# Patient Record
Sex: MALE | Race: WHITE | NOT HISPANIC OR LATINO | Employment: FULL TIME | URBAN - METROPOLITAN AREA
[De-identification: names, ages, dates, MRNs, and addresses within clinical notes are randomized per-mention and may not be internally consistent; named-entity substitution may affect disease eponyms.]

---

## 2018-07-16 ENCOUNTER — OFFICE VISIT (OUTPATIENT)
Dept: MEDICAL GROUP | Facility: PHYSICIAN GROUP | Age: 58
End: 2018-07-16
Payer: COMMERCIAL

## 2018-07-16 VITALS
RESPIRATION RATE: 12 BRPM | DIASTOLIC BLOOD PRESSURE: 70 MMHG | WEIGHT: 223 LBS | BODY MASS INDEX: 30.2 KG/M2 | HEIGHT: 72 IN | SYSTOLIC BLOOD PRESSURE: 138 MMHG | HEART RATE: 91 BPM | TEMPERATURE: 99.1 F | OXYGEN SATURATION: 95 %

## 2018-07-16 DIAGNOSIS — Z00.00 WELL ADULT EXAM: ICD-10-CM

## 2018-07-16 DIAGNOSIS — E66.9 OBESITY (BMI 30-39.9): ICD-10-CM

## 2018-07-16 PROCEDURE — 99386 PREV VISIT NEW AGE 40-64: CPT | Performed by: FAMILY MEDICINE

## 2018-07-16 ASSESSMENT — ENCOUNTER SYMPTOMS
BLURRED VISION: 0
DIZZINESS: 0
CHILLS: 0
HEARTBURN: 0
BRUISES/BLEEDS EASILY: 0
COUGH: 0
HEADACHES: 0
GASTROINTESTINAL NEGATIVE: 1
DOUBLE VISION: 0
BACK PAIN: 1
RESPIRATORY NEGATIVE: 1
DEPRESSION: 0
MYALGIAS: 0
HEMOPTYSIS: 0
EYES NEGATIVE: 1
PALPITATIONS: 0
FEVER: 0
TINGLING: 0
NAUSEA: 0
NEUROLOGICAL NEGATIVE: 1
CONSTITUTIONAL NEGATIVE: 1
CARDIOVASCULAR NEGATIVE: 1
PSYCHIATRIC NEGATIVE: 1

## 2018-07-16 ASSESSMENT — PATIENT HEALTH QUESTIONNAIRE - PHQ9: CLINICAL INTERPRETATION OF PHQ2 SCORE: 0

## 2018-07-16 NOTE — PROGRESS NOTES
Subjective:      Kris Lewis is a 57 y.o. male who presents with Back Pain (lower back )            New patient visit works as owner of [employer]  History of kirti en y in the past and colonic polyps with q 5 year colonoscopies    1. Well adult exam    - REFERRAL TO GI FOR COLONOSCOPY  - Patient identified as having weight management issue.  Appropriate orders and counseling given.  - COMP METABOLIC PANEL; Future  - FREE THYROXINE; Future  - LIPID PROFILE; Future  - TRIIDOTHYRONINE; Future  - TSH; Future  - VITAMIN D,25 HYDROXY; Future  - CBC WITHOUT DIFFERENTIAL; Future  - PROSTATE SPECIFIC AG SCREENING; Future  - VITAMIN B12; Future  - FOLATE; Future    2. Obesity (BMI 30-39.9)    - REFERRAL TO GI FOR COLONOSCOPY  - Patient identified as having weight management issue.  Appropriate orders and counseling given.  - COMP METABOLIC PANEL; Future  - FREE THYROXINE; Future  - LIPID PROFILE; Future  - TRIIDOTHYRONINE; Future  - TSH; Future  - VITAMIN D,25 HYDROXY; Future  - CBC WITHOUT DIFFERENTIAL; Future  - PROSTATE SPECIFIC AG SCREENING; Future  - VITAMIN B12; Future  - FOLATE; Future    History reviewed. No pertinent past medical history.  Past Surgical History:  No date: GASTRIC BYPASS LAPAROSCOPIC  Smoking status: Former Smoker                                                              Packs/day: 0.00      Years: 10.00        Quit date: 7/16/2000  Smokeless tobacco: Never Used                      Alcohol use: No              Review of patient's family history indicates:    Cancer                         Father                      No current outpatient prescriptions on file.    Patient was instructed on the use of medications, either prescriptions or OTC and informed on when the appropriate follow up time period should be. In addition, patient was also instructed that should any acute worsening occur that they should notify this clinic asap or call 911.            Review of Systems   Constitutional:  "Negative.  Negative for chills and fever.   HENT: Negative.  Negative for hearing loss.    Eyes: Negative.  Negative for blurred vision and double vision.   Respiratory: Negative.  Negative for cough and hemoptysis.    Cardiovascular: Negative.  Negative for chest pain and palpitations.   Gastrointestinal: Negative.  Negative for heartburn and nausea.   Genitourinary: Negative.  Negative for dysuria.   Musculoskeletal: Positive for back pain. Negative for myalgias.   Skin: Negative.  Negative for rash.   Neurological: Negative.  Negative for dizziness, tingling and headaches.   Endo/Heme/Allergies: Negative.  Does not bruise/bleed easily.   Psychiatric/Behavioral: Negative.  Negative for depression and suicidal ideas.   All other systems reviewed and are negative.         Objective:     /70   Pulse 91   Temp 37.3 °C (99.1 °F)   Resp 12   Ht 1.82 m (5' 11.65\")   Wt 101.2 kg (223 lb)   SpO2 95%   BMI 30.54 kg/m²      Physical Exam   Constitutional: He is oriented to person, place, and time. He appears well-developed and well-nourished. No distress.   HENT:   Head: Normocephalic and atraumatic.   Right Ear: External ear normal.   Left Ear: External ear normal.   Nose: Nose normal.   Mouth/Throat: Oropharynx is clear and moist. No oropharyngeal exudate.   Eyes: Pupils are equal, round, and reactive to light. Right eye exhibits no discharge. Left eye exhibits no discharge. No scleral icterus.   Neck: Normal range of motion. Neck supple. No JVD present. No tracheal deviation present. No thyromegaly present.   Cardiovascular: Normal rate, regular rhythm, normal heart sounds and intact distal pulses.  Exam reveals no gallop and no friction rub.    No murmur heard.  Pulmonary/Chest: Effort normal and breath sounds normal. No stridor. No respiratory distress. He has no wheezes. He has no rales. He exhibits no tenderness.   Abdominal: Soft. He exhibits no distension. There is no tenderness.   Lymphadenopathy:     " He has no cervical adenopathy.   Neurological: He is alert and oriented to person, place, and time. No cranial nerve deficit.   Skin: He is not diaphoretic.   Psychiatric: He has a normal mood and affect. His behavior is normal. Judgment and thought content normal.   Nursing note and vitals reviewed.              Assessment/Plan:     1. Well adult exam    - REFERRAL TO GI FOR COLONOSCOPY  - Patient identified as having weight management issue.  Appropriate orders and counseling given.  - COMP METABOLIC PANEL; Future  - FREE THYROXINE; Future  - LIPID PROFILE; Future  - TRIIDOTHYRONINE; Future  - TSH; Future  - VITAMIN D,25 HYDROXY; Future  - CBC WITHOUT DIFFERENTIAL; Future  - PROSTATE SPECIFIC AG SCREENING; Future  - VITAMIN B12; Future  - FOLATE; Future    2. Obesity (BMI 30-39.9)  - REFERRAL TO GI FOR COLONOSCOPY  - Patient identified as having weight management issue.  Appropriate orders and counseling given.  - COMP METABOLIC PANEL; Future  - FREE THYROXINE; Future  - LIPID PROFILE; Future  - TRIIDOTHYRONINE; Future  - TSH; Future  - VITAMIN D,25 HYDROXY; Future  - CBC WITHOUT DIFFERENTIAL; Future  - PROSTATE SPECIFIC AG SCREENING; Future  - VITAMIN B12; Future  - FOLATE; Future

## 2018-07-18 ENCOUNTER — HOSPITAL ENCOUNTER (OUTPATIENT)
Dept: LAB | Facility: MEDICAL CENTER | Age: 58
End: 2018-07-18
Attending: FAMILY MEDICINE
Payer: COMMERCIAL

## 2018-07-18 DIAGNOSIS — Z00.00 WELL ADULT EXAM: ICD-10-CM

## 2018-07-18 DIAGNOSIS — E66.9 OBESITY (BMI 30-39.9): ICD-10-CM

## 2018-07-18 LAB
25(OH)D3 SERPL-MCNC: 25 NG/ML (ref 30–100)
ALBUMIN SERPL BCP-MCNC: 3.7 G/DL (ref 3.2–4.9)
ALBUMIN/GLOB SERPL: 1.1 G/DL
ALP SERPL-CCNC: 97 U/L (ref 30–99)
ALT SERPL-CCNC: 13 U/L (ref 2–50)
ANION GAP SERPL CALC-SCNC: 7 MMOL/L (ref 0–11.9)
AST SERPL-CCNC: 16 U/L (ref 12–45)
BILIRUB SERPL-MCNC: 0.7 MG/DL (ref 0.1–1.5)
BUN SERPL-MCNC: 15 MG/DL (ref 8–22)
CALCIUM SERPL-MCNC: 8.8 MG/DL (ref 8.5–10.5)
CHLORIDE SERPL-SCNC: 105 MMOL/L (ref 96–112)
CHOLEST SERPL-MCNC: 163 MG/DL (ref 100–199)
CO2 SERPL-SCNC: 28 MMOL/L (ref 20–33)
CREAT SERPL-MCNC: 0.82 MG/DL (ref 0.5–1.4)
ERYTHROCYTE [DISTWIDTH] IN BLOOD BY AUTOMATED COUNT: 48.5 FL (ref 35.9–50)
FOLATE SERPL-MCNC: 8.7 NG/ML
GLOBULIN SER CALC-MCNC: 3.4 G/DL (ref 1.9–3.5)
GLUCOSE SERPL-MCNC: 92 MG/DL (ref 65–99)
HCT VFR BLD AUTO: 46.7 % (ref 42–52)
HDLC SERPL-MCNC: 65 MG/DL
HGB BLD-MCNC: 15.5 G/DL (ref 14–18)
LDLC SERPL CALC-MCNC: 78 MG/DL
MCH RBC QN AUTO: 31.3 PG (ref 27–33)
MCHC RBC AUTO-ENTMCNC: 33.2 G/DL (ref 33.7–35.3)
MCV RBC AUTO: 94.3 FL (ref 81.4–97.8)
PLATELET # BLD AUTO: 214 K/UL (ref 164–446)
PMV BLD AUTO: 11 FL (ref 9–12.9)
POTASSIUM SERPL-SCNC: 4.1 MMOL/L (ref 3.6–5.5)
PROT SERPL-MCNC: 7.1 G/DL (ref 6–8.2)
PSA SERPL-MCNC: 1.7 NG/ML (ref 0–4)
RBC # BLD AUTO: 4.95 M/UL (ref 4.7–6.1)
SODIUM SERPL-SCNC: 140 MMOL/L (ref 135–145)
T3 SERPL-MCNC: 65.2 NG/DL (ref 60–181)
T4 FREE SERPL-MCNC: 0.79 NG/DL (ref 0.53–1.43)
TRIGL SERPL-MCNC: 101 MG/DL (ref 0–149)
TSH SERPL DL<=0.005 MIU/L-ACNC: 2.66 UIU/ML (ref 0.38–5.33)
VIT B12 SERPL-MCNC: 525 PG/ML (ref 211–911)
WBC # BLD AUTO: 8.4 K/UL (ref 4.8–10.8)

## 2018-07-18 PROCEDURE — 85027 COMPLETE CBC AUTOMATED: CPT

## 2018-07-18 PROCEDURE — 82746 ASSAY OF FOLIC ACID SERUM: CPT

## 2018-07-18 PROCEDURE — 82306 VITAMIN D 25 HYDROXY: CPT

## 2018-07-18 PROCEDURE — 80061 LIPID PANEL: CPT

## 2018-07-18 PROCEDURE — 84480 ASSAY TRIIODOTHYRONINE (T3): CPT

## 2018-07-18 PROCEDURE — 36415 COLL VENOUS BLD VENIPUNCTURE: CPT

## 2018-07-18 PROCEDURE — 84443 ASSAY THYROID STIM HORMONE: CPT

## 2018-07-18 PROCEDURE — 84153 ASSAY OF PSA TOTAL: CPT

## 2018-07-18 PROCEDURE — 84439 ASSAY OF FREE THYROXINE: CPT

## 2018-07-18 PROCEDURE — 80053 COMPREHEN METABOLIC PANEL: CPT

## 2018-07-18 PROCEDURE — 82607 VITAMIN B-12: CPT

## 2018-07-19 ENCOUNTER — TELEPHONE (OUTPATIENT)
Dept: MEDICAL GROUP | Facility: PHYSICIAN GROUP | Age: 58
End: 2018-07-19

## 2018-07-19 NOTE — TELEPHONE ENCOUNTER
----- Message from Dima Stevens M.D. sent at 7/19/2018  8:34 AM PDT -----  Labs show patient is low on vitamin d, they needs to replace this with 2000 units per day otc

## 2018-08-25 ENCOUNTER — OFFICE VISIT (OUTPATIENT)
Dept: URGENT CARE | Facility: CLINIC | Age: 58
End: 2018-08-25
Payer: COMMERCIAL

## 2018-08-25 VITALS
BODY MASS INDEX: 31.36 KG/M2 | RESPIRATION RATE: 16 BRPM | OXYGEN SATURATION: 97 % | DIASTOLIC BLOOD PRESSURE: 86 MMHG | HEIGHT: 71 IN | SYSTOLIC BLOOD PRESSURE: 146 MMHG | HEART RATE: 76 BPM | WEIGHT: 224 LBS | TEMPERATURE: 98.2 F

## 2018-08-25 DIAGNOSIS — B02.9 HERPES ZOSTER WITHOUT COMPLICATION: ICD-10-CM

## 2018-08-25 PROCEDURE — 99214 OFFICE O/P EST MOD 30 MIN: CPT | Performed by: NURSE PRACTITIONER

## 2018-08-25 RX ORDER — HYDROCODONE BITARTRATE AND ACETAMINOPHEN 5; 325 MG/1; MG/1
1-2 TABLET ORAL EVERY 4 HOURS PRN
Qty: 15 TAB | Refills: 0 | Status: SHIPPED | OUTPATIENT
Start: 2018-08-25 | End: 2018-08-29

## 2018-08-25 RX ORDER — VALACYCLOVIR HYDROCHLORIDE 1 G/1
1000 TABLET, FILM COATED ORAL 2 TIMES DAILY
Qty: 14 TAB | Refills: 0 | Status: SHIPPED | OUTPATIENT
Start: 2018-08-25 | End: 2018-09-01

## 2018-08-25 ASSESSMENT — ENCOUNTER SYMPTOMS
FEVER: 0
BACK PAIN: 1

## 2018-08-25 NOTE — PROGRESS NOTES
"Subjective:      Kris Lewis is a 57 y.o. male who presents with Low Back Pain (Started Wednesday low back pain on right side that goes down leg, pt states he can feel the pain in his bone) and Rash (Left leg, noticed friday, no itch)            This is a new problem. Pt reports he developed lower right back and leg pain about 3 days ago. He admits he went to the chiropractor 2 days ago for an adjustment and states the back pain improved only slightly. He then reports he developed a rash on his right buttock and down onto his right thigh. He states the pain he is experiencing in his leg is very deep and it aches almost constantly. He has been taking Advil every 4 hours at night to help him sleep but it does not seem to be working. He denies any recent fever. He has not taken any medication for the rash.        Review of Systems   Constitutional: Negative for fever.   Musculoskeletal: Positive for back pain.   Skin: Positive for rash.   All other systems reviewed and are negative.    History reviewed. No pertinent past medical history.   Past Surgical History:   Procedure Laterality Date   • GASTRIC BYPASS LAPAROSCOPIC        Social History     Social History   • Marital status:      Spouse name: N/A   • Number of children: N/A   • Years of education: N/A     Occupational History   • Not on file.     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 " Vital signs are normal. He appears well-developed and well-nourished.   HENT:   Head: Normocephalic and atraumatic.   Eyes: Pupils are equal, round, and reactive to light. EOM are normal.   Neck: Normal range of motion.   Cardiovascular: Normal rate and regular rhythm.    Pulmonary/Chest: Effort normal.   Musculoskeletal: Normal range of motion.        Lumbar back: He exhibits pain.        Back:         Legs:  Neurological: He is alert and oriented to person, place, and time.   Skin: Skin is warm and dry. Capillary refill takes less than 2 seconds.   Psychiatric: He has a normal mood and affect. His speech is normal and behavior is normal. Thought content normal.   Vitals reviewed.              Assessment/Plan:     1. Herpes zoster without complication  - valacyclovir (VALTREX) 1 GM Tab; Take 1 Tab by mouth 2 times a day for 7 days.  Dispense: 14 Tab; Refill: 0  - HYDROcodone-acetaminophen (NORCO) 5-325 MG Tab per tablet; Take 1-2 Tabs by mouth every four hours as needed for up to 4 days.  Dispense: 15 Tab; Refill: 0  - CONSENT FOR OPIATE PRESCRIPTION    Advised pt to avoid small children and the elderly for another 7 days as this is the time when he is the most contagious. Wear long pants and avoid direct skin to skin contact with any individual  In 10 days I would like for him to follow up with PCP to have a follow up check and get the Zostavax vaccine. He understands and agrees  Can alternate tylenol and ibuprofen as needed for pain  Sedating effects of pain medication discussed. Consent signed. Checked patient's  and find no evidence of narcotic misuse.  Supportive care, differential diagnoses, and indications for immediate follow-up discussed with patient.    Pathogenesis of diagnosis discussed including typical length and natural progression.      Instructed to return to  or nearest emergency department if symptoms fail to improve, for any change in condition, further concerns, or new concerning  symptoms.  Patient states understanding of the plan of care and discharge instructions.

## 2018-09-04 ENCOUNTER — OFFICE VISIT (OUTPATIENT)
Dept: MEDICAL GROUP | Facility: PHYSICIAN GROUP | Age: 58
End: 2018-09-04
Payer: COMMERCIAL

## 2018-09-04 VITALS
SYSTOLIC BLOOD PRESSURE: 160 MMHG | BODY MASS INDEX: 30.94 KG/M2 | TEMPERATURE: 98.4 F | DIASTOLIC BLOOD PRESSURE: 80 MMHG | WEIGHT: 221 LBS | RESPIRATION RATE: 16 BRPM | HEART RATE: 80 BPM | HEIGHT: 71 IN | OXYGEN SATURATION: 95 %

## 2018-09-04 DIAGNOSIS — B02.9 HERPES ZOSTER WITHOUT COMPLICATION: ICD-10-CM

## 2018-09-04 PROCEDURE — 99214 OFFICE O/P EST MOD 30 MIN: CPT | Performed by: FAMILY MEDICINE

## 2018-09-04 RX ORDER — VALACYCLOVIR HYDROCHLORIDE 1 G/1
1000 TABLET, FILM COATED ORAL 2 TIMES DAILY
COMMUNITY
End: 2021-03-08

## 2018-09-04 RX ORDER — OXYCODONE HYDROCHLORIDE AND ACETAMINOPHEN 5; 325 MG/1; MG/1
1 TABLET ORAL 2 TIMES DAILY PRN
Qty: 20 TAB | Refills: 0 | Status: SHIPPED | OUTPATIENT
Start: 2018-09-04 | End: 2018-09-14

## 2018-09-04 RX ORDER — HYDROCODONE BITARTRATE AND ACETAMINOPHEN 5; 325 MG/1; MG/1
1-2 TABLET ORAL EVERY 4 HOURS PRN
COMMUNITY
End: 2021-03-08

## 2018-09-04 ASSESSMENT — ENCOUNTER SYMPTOMS
BLURRED VISION: 0
RESPIRATORY NEGATIVE: 1
EYES NEGATIVE: 1
NAUSEA: 0
CHILLS: 0
PALPITATIONS: 0
TINGLING: 0
HEARTBURN: 0
DOUBLE VISION: 0
COUGH: 0
DEPRESSION: 0
MUSCULOSKELETAL NEGATIVE: 1
PSYCHIATRIC NEGATIVE: 1
CARDIOVASCULAR NEGATIVE: 1
HEADACHES: 0
NEUROLOGICAL NEGATIVE: 1
MYALGIAS: 0
DIZZINESS: 0
FEVER: 0
HEMOPTYSIS: 0
CONSTITUTIONAL NEGATIVE: 1
GASTROINTESTINAL NEGATIVE: 1
BRUISES/BLEEDS EASILY: 0

## 2018-09-04 NOTE — PROGRESS NOTES
Subjective:      Kris Lewis is a 57 y.o. male who presents with Immunizations            1. Herpes zoster without complication  [facility]les [facility]  Rash and pain in the right leg and buttocks  Improved with percocet for pain but not with norco  Will give short rx for percocet and will sign consent form   This patient is continuing to use a controlled substance (CS) on a long term basis.  The patient is thoroughly aware of the goals of treatment with the CS  The patient is aware that yearly and random urine drug screens are required.  The patient has been instructed to take the CS only as prescribed.  The patient is prohibited from sharing the CS with any other person.  The patient is instructed to inform the provider if any other CS is taken, of any alcohol or cannabis or other recreational drug use, any treatment for side effects of the CS or complications, if they have CS active rx in other states  The patient has evidence for a reason for the CS  The treatment plan has been discussed with the patient  The  report has been reviewed  Will need to wait until rash is completely resolved before he can get the shingles vaccine  - HYDROcodone-acetaminophen (NORCO) 5-325 MG Tab per tablet; Take 1-2 Tabs by mouth every four hours as needed.  - valacyclovir (VALTREX) 1 GM Tab; Take 1,000 mg by mouth 2 times a day.  - Zoster Vac Recomb Adjuvanted (SHINGRIX) 50 MCG Recon Susp; 0.5 mL by Intramuscular route Once for 1 dose.  Dispense: 0.5 mL; Refill: 0  - Lidocaine 2 % Gel; 1 g by Apply externally route 2 times a day as needed.  Dispense: 60 g; Refill: 3  - oxyCODONE-acetaminophen (PERCOCET) 5-325 MG Tab; Take 1 Tab by mouth 2 times a day as needed for up to 10 days.  Dispense: 20 Tab; Refill: 0  - Controlled Substance Treatment Agreement    History reviewed. No pertinent past medical history.  Past Surgical History:  No date: GASTRIC BYPASS LAPAROSCOPIC  Smoking status: Former Smoker                                                               Packs/day: 0.00      Years: 10.00        Quit date: 7/16/2000  Smokeless tobacco: Never Used                      Alcohol use: No              Review of patient's family history indicates:  Problem: Cancer      Relation: Father       Age of Onset: (Not Specified)       Current Outpatient Prescriptions: •  HYDROcodone-acetaminophen (NORCO) 5-325 MG Tab per tablet, Take 1-2 Tabs by mouth every four hours as needed., Disp: , Rfl: •  valacyclovir (VALTREX) 1 GM Tab, Take 1,000 mg by mouth 2 times a day., Disp: , Rfl: •  Zoster Vac Recomb Adjuvanted (SHINGRIX) 50 MCG Recon Susp, 0.5 mL by Intramuscular route Once for 1 dose., Disp: 0.5 mL, Rfl: 0•  Lidocaine 2 % Gel, 1 g by Apply externally route 2 times a day as needed., Disp: 60 g, Rfl: 3•  oxyCODONE-acetaminophen (PERCOCET) 5-325 MG Tab, Take 1 Tab by mouth 2 times a day as needed for up to 10 days., Disp: 20 Tab, Rfl: 0    Patient was instructed on the use of medications, either prescriptions or OTC and informed on when the appropriate follow up time period should be. In addition, patient was also instructed that should any acute worsening occur that they should notify this clinic asap or call 911.            Review of Systems   Constitutional: Negative.  Negative for chills and fever.   HENT: Negative.  Negative for hearing loss.    Eyes: Negative.  Negative for blurred vision and double vision.   Respiratory: Negative.  Negative for cough and hemoptysis.    Cardiovascular: Negative.  Negative for chest pain and palpitations.   Gastrointestinal: Negative.  Negative for heartburn and nausea.   Genitourinary: Negative.  Negative for dysuria.   Musculoskeletal: Negative.  Negative for myalgias.   Skin: Positive for itching and rash.   Neurological: Negative.  Negative for dizziness, tingling and headaches.   Endo/Heme/Allergies: Negative.  Does not bruise/bleed easily.   Psychiatric/Behavioral: Negative.  Negative for depression and suicidal  "ideas.   All other systems reviewed and are negative.         Objective:     /80   Pulse 80   Temp 36.9 °C (98.4 °F)   Resp 16   Ht 1.803 m (5' 11\")   Wt 100.2 kg (221 lb)   SpO2 95%   BMI 30.82 kg/m²      Physical Exam   Constitutional: He is oriented to person, place, and time. He appears well-developed and well-nourished. No distress.   HENT:   Head: Normocephalic and atraumatic.   Mouth/Throat: Oropharynx is clear and moist. No oropharyngeal exudate.   Eyes: Pupils are equal, round, and reactive to light.   Cardiovascular: Normal rate, regular rhythm, normal heart sounds and intact distal pulses.  Exam reveals no gallop and no friction rub.    No murmur heard.  Pulmonary/Chest: Effort normal and breath sounds normal. No respiratory distress. He has no wheezes. He has no rales. He exhibits no tenderness.   Neurological: He is alert and oriented to person, place, and time.   Skin: He is not diaphoretic.        Healing vesicles in areas noted   Psychiatric: He has a normal mood and affect. His behavior is normal. Judgment and thought content normal.   Nursing note and vitals reviewed.              Assessment/Plan:     1. Herpes zoster without complication    - HYDROcodone-acetaminophen (NORCO) 5-325 MG Tab per tablet; Take 1-2 Tabs by mouth every four hours as needed.  - valacyclovir (VALTREX) 1 GM Tab; Take 1,000 mg by mouth 2 times a day.  - Zoster Vac Recomb Adjuvanted (SHINGRIX) 50 MCG Recon Susp; 0.5 mL by Intramuscular route Once for 1 dose.  Dispense: 0.5 mL; Refill: 0  - Lidocaine 2 % Gel; 1 g by Apply externally route 2 times a day as needed.  Dispense: 60 g; Refill: 3  - oxyCODONE-acetaminophen (PERCOCET) 5-325 MG Tab; Take 1 Tab by mouth 2 times a day as needed for up to 10 days.  Dispense: 20 Tab; Refill: 0  - Controlled Substance Treatment Agreement      "

## 2020-03-03 ENCOUNTER — OFFICE VISIT (OUTPATIENT)
Dept: MEDICAL GROUP | Facility: PHYSICIAN GROUP | Age: 60
End: 2020-03-03
Payer: COMMERCIAL

## 2020-03-03 VITALS
BODY MASS INDEX: 28.79 KG/M2 | WEIGHT: 212.6 LBS | TEMPERATURE: 98.6 F | OXYGEN SATURATION: 96 % | RESPIRATION RATE: 16 BRPM | HEIGHT: 72 IN | HEART RATE: 111 BPM | SYSTOLIC BLOOD PRESSURE: 130 MMHG | DIASTOLIC BLOOD PRESSURE: 70 MMHG

## 2020-03-03 DIAGNOSIS — R31.9 URINARY TRACT INFECTION WITH HEMATURIA, SITE UNSPECIFIED: ICD-10-CM

## 2020-03-03 DIAGNOSIS — R30.0 DYSURIA: ICD-10-CM

## 2020-03-03 DIAGNOSIS — N39.0 URINARY TRACT INFECTION WITH HEMATURIA, SITE UNSPECIFIED: ICD-10-CM

## 2020-03-03 LAB
APPEARANCE UR: CLEAR
BILIRUB UR STRIP-MCNC: NEGATIVE MG/DL
COLOR UR AUTO: NORMAL
GLUCOSE UR STRIP.AUTO-MCNC: NEGATIVE MG/DL
KETONES UR STRIP.AUTO-MCNC: NEGATIVE MG/DL
LEUKOCYTE ESTERASE UR QL STRIP.AUTO: NORMAL
NITRITE UR QL STRIP.AUTO: NEGATIVE
PH UR STRIP.AUTO: 5 [PH] (ref 5–8)
PROT UR QL STRIP: NORMAL MG/DL
RBC UR QL AUTO: NORMAL
SP GR UR STRIP.AUTO: 1.03
UROBILINOGEN UR STRIP-MCNC: 0.2 MG/DL

## 2020-03-03 PROCEDURE — 81002 URINALYSIS NONAUTO W/O SCOPE: CPT | Performed by: FAMILY MEDICINE

## 2020-03-03 PROCEDURE — 99214 OFFICE O/P EST MOD 30 MIN: CPT | Performed by: FAMILY MEDICINE

## 2020-03-03 RX ORDER — SULFAMETHOXAZOLE AND TRIMETHOPRIM 800; 160 MG/1; MG/1
1 TABLET ORAL EVERY 12 HOURS
Qty: 28 TAB | Refills: 0 | Status: SHIPPED | OUTPATIENT
Start: 2020-03-03 | End: 2020-03-17

## 2020-03-03 ASSESSMENT — PATIENT HEALTH QUESTIONNAIRE - PHQ9: CLINICAL INTERPRETATION OF PHQ2 SCORE: 0

## 2020-03-03 ASSESSMENT — FIBROSIS 4 INDEX: FIB4 SCORE: 1.2

## 2020-03-03 NOTE — PROGRESS NOTES
Chief Complaint   Patient presents with   • Dysuria     x4 days progresively getting worse, bloody urine, testicular pain x1day       HPI:  Symptom onset: 5 days ago   Current symptoms: Painful, urgent, frequent voids. No blood noted in urine.  Since onset symptoms are: Unchanged  Treatments tried: OTC antispasm med.  Associated symptoms: Negative for fever, flank pain, nausea and vomiting, vaginal discharge, pelvic pain.  History is positive for frequent UTI.     ROS:  Denies fever, chills, vomiting or abdominal pain.     OBJECTIVE:  /70 (BP Location: Left arm, Patient Position: Sitting)   Pulse (!) 111   Temp 37 °C (98.6 °F) (Tympanic)   Resp 16   Ht 1.829 m (6')   Wt 96.4 kg (212 lb 9.6 oz)   SpO2 96%   Gen: Alert, NAD.  Chest: Lungs clear to auscultation, CV RRR.  Abdomen: Soft, tender in suprapubic region. No CVAT. Normal bowel sounds.     Lab Results   Component Value Date    POCCOLOR dark yellow 03/03/2020    POCAPPEAR clear 03/03/2020    POCLEUKEST Small 03/03/2020    POCNITRITE Negative 03/03/2020    POCUROBILIGE 0.2 03/03/2020    POCPROTEIN trace 03/03/2020    POCURPH 5.0 03/03/2020    POCBLOOD trace 03/03/2020    POCSPGRV 1.030 03/03/2020    POCKETONES negative 03/03/2020    POCBILIRUBIN Negative 03/03/2020    POCGLUCUA Negative 03/03/2020          ASSESSMENT/PLAN:     1. Dysuria    2. Urinary tract infection with hematuria, site unspecified     f/u one month for another dip to make sure blood gone     1. Abnormal urine dipstick in office. Urine sent for culture. Start antibiotics.  2. Provided education to drink plenty of fluids, wipe front to back every void and bowel movement.   3. Return to clinic if symptoms not improving within 3-4 days or in case of vomiting, fever, increasing pain.

## 2020-04-06 ENCOUNTER — OFFICE VISIT (OUTPATIENT)
Dept: MEDICAL GROUP | Facility: PHYSICIAN GROUP | Age: 60
End: 2020-04-06
Payer: COMMERCIAL

## 2020-04-06 VITALS
HEART RATE: 89 BPM | HEIGHT: 73 IN | OXYGEN SATURATION: 96 % | SYSTOLIC BLOOD PRESSURE: 142 MMHG | TEMPERATURE: 98.2 F | RESPIRATION RATE: 16 BRPM | WEIGHT: 218 LBS | BODY MASS INDEX: 28.89 KG/M2 | DIASTOLIC BLOOD PRESSURE: 74 MMHG

## 2020-04-06 DIAGNOSIS — R30.0 DYSURIA: ICD-10-CM

## 2020-04-06 LAB
APPEARANCE UR: CLEAR
BILIRUB UR STRIP-MCNC: NEGATIVE MG/DL
COLOR UR AUTO: YELLOW
GLUCOSE UR STRIP.AUTO-MCNC: NEGATIVE MG/DL
KETONES UR STRIP.AUTO-MCNC: NEGATIVE MG/DL
LEUKOCYTE ESTERASE UR QL STRIP.AUTO: NEGATIVE
NITRITE UR QL STRIP.AUTO: NEGATIVE
PH UR STRIP.AUTO: 5 [PH] (ref 5–8)
PROT UR QL STRIP: NEGATIVE MG/DL
RBC UR QL AUTO: NEGATIVE
SP GR UR STRIP.AUTO: 1.02
UROBILINOGEN UR STRIP-MCNC: 0.2 MG/DL

## 2020-04-06 PROCEDURE — 99213 OFFICE O/P EST LOW 20 MIN: CPT | Performed by: FAMILY MEDICINE

## 2020-04-06 PROCEDURE — 81002 URINALYSIS NONAUTO W/O SCOPE: CPT | Performed by: FAMILY MEDICINE

## 2020-04-06 ASSESSMENT — FIBROSIS 4 INDEX: FIB4 SCORE: 1.2

## 2020-04-06 NOTE — PROGRESS NOTES
Over 50% of this 15 minute visit (all time was face to face) was spent on counseling and coordination of care regarding the patient's current problem of   1. Dysuria  Sx of uti resolved and u/a today with no more evidence of hematuria nor infection  Advised on hydration and rtc should sx return  - POCT Urinalysis    No past medical history on file.  Past Surgical History:   Procedure Laterality Date   • GASTRIC BYPASS LAPAROSCOPIC       Social History     Tobacco Use   • Smoking status: Former Smoker     Years: 10.00     Last attempt to quit: 2000     Years since quittin.7   • Smokeless tobacco: Never Used   Substance Use Topics   • Alcohol use: No   • Drug use: Not Currently     Family History   Problem Relation Age of Onset   • Cancer Father          Current Outpatient Medications:   •  HYDROcodone-acetaminophen (NORCO) 5-325 MG Tab per tablet, Take 1-2 Tabs by mouth every four hours as needed., Disp: , Rfl:   •  valacyclovir (VALTREX) 1 GM Tab, Take 1,000 mg by mouth 2 times a day., Disp: , Rfl:   •  Lidocaine 2 % Gel, 1 g by Apply externally route 2 times a day as needed. (Patient not taking: Reported on 3/3/2020), Disp: 60 g, Rfl: 3    Patient was instructed on the use of medications, either prescriptions or OTC and informed on when the appropriate follow up time period should be. In addition, patient was also instructed that should any acute worsening occur that they should notify this clinic asap or call 911.

## 2021-03-08 ENCOUNTER — OFFICE VISIT (OUTPATIENT)
Dept: MEDICAL GROUP | Facility: PHYSICIAN GROUP | Age: 61
End: 2021-03-08
Payer: COMMERCIAL

## 2021-03-08 VITALS
HEART RATE: 77 BPM | WEIGHT: 197 LBS | DIASTOLIC BLOOD PRESSURE: 86 MMHG | TEMPERATURE: 97.9 F | HEIGHT: 73 IN | SYSTOLIC BLOOD PRESSURE: 138 MMHG | OXYGEN SATURATION: 96 % | RESPIRATION RATE: 14 BRPM | BODY MASS INDEX: 26.11 KG/M2

## 2021-03-08 DIAGNOSIS — R42 DIZZY SPELLS: ICD-10-CM

## 2021-03-08 PROBLEM — E66.9 OBESITY (BMI 30-39.9): Status: RESOLVED | Noted: 2018-07-16 | Resolved: 2021-03-08

## 2021-03-08 PROCEDURE — 99214 OFFICE O/P EST MOD 30 MIN: CPT | Performed by: FAMILY MEDICINE

## 2021-03-08 RX ORDER — AMOXICILLIN 500 MG/1
CAPSULE ORAL
COMMUNITY
Start: 2021-01-07 | End: 2021-03-08

## 2021-03-08 RX ORDER — IBUPROFEN 600 MG/1
TABLET ORAL
COMMUNITY
Start: 2021-01-07 | End: 2021-03-08

## 2021-03-08 RX ORDER — CHLORHEXIDINE GLUCONATE ORAL RINSE 1.2 MG/ML
SOLUTION DENTAL
COMMUNITY
Start: 2021-01-07 | End: 2021-03-08

## 2021-03-08 ASSESSMENT — ENCOUNTER SYMPTOMS
MUSCULOSKELETAL NEGATIVE: 1
TINGLING: 0
PALPITATIONS: 0
ABDOMINAL PAIN: 0
CONSTITUTIONAL NEGATIVE: 1
FEVER: 0
PSYCHIATRIC NEGATIVE: 1
MYALGIAS: 0
DEPRESSION: 0
CHANGE IN BOWEL HABIT: 0
EYES NEGATIVE: 1
NAUSEA: 0
CHILLS: 0
RESPIRATORY NEGATIVE: 1
HEMOPTYSIS: 0
DOUBLE VISION: 0
BRUISES/BLEEDS EASILY: 0
HEARTBURN: 0
BLURRED VISION: 0
COUGH: 0
GASTROINTESTINAL NEGATIVE: 1
CARDIOVASCULAR NEGATIVE: 1
ARTHRALGIAS: 0
HEADACHES: 0
ANOREXIA: 0
DIZZINESS: 1

## 2021-03-08 ASSESSMENT — PATIENT HEALTH QUESTIONNAIRE - PHQ9: CLINICAL INTERPRETATION OF PHQ2 SCORE: 0

## 2021-03-09 ENCOUNTER — HOSPITAL ENCOUNTER (OUTPATIENT)
Dept: LAB | Facility: MEDICAL CENTER | Age: 61
End: 2021-03-09
Attending: FAMILY MEDICINE
Payer: COMMERCIAL

## 2021-03-09 DIAGNOSIS — R42 DIZZY SPELLS: ICD-10-CM

## 2021-03-09 PROCEDURE — 84480 ASSAY TRIIODOTHYRONINE (T3): CPT

## 2021-03-09 PROCEDURE — 80053 COMPREHEN METABOLIC PANEL: CPT

## 2021-03-09 PROCEDURE — 85027 COMPLETE CBC AUTOMATED: CPT

## 2021-03-09 PROCEDURE — 36415 COLL VENOUS BLD VENIPUNCTURE: CPT

## 2021-03-09 PROCEDURE — 80061 LIPID PANEL: CPT

## 2021-03-09 PROCEDURE — 84439 ASSAY OF FREE THYROXINE: CPT

## 2021-03-09 NOTE — PROGRESS NOTES
Subjective:      Kris Lewis is a 60 y.o. male who presents with Annual Exam            Several dizzy spells over past few months associated with movement and activity,.resolve with rest after a few minutes  No other sx  1. Dizzy spells    - FREE THYROXINE; Future  - Comp Metabolic Panel; Future  - TRIIDOTHYRONINE; Future  - Lipid Profile; Future  - CBC WITHOUT DIFFERENTIAL; Future  - US-CAROTID DOPPLER BILAT; Future  - CT-HEAD W/O; Future    No past medical history on file.  Past Surgical History:  No date: GASTRIC BYPASS LAPAROSCOPIC  Social History    Tobacco Use      Smoking status: Former Smoker        Years: 10.00        Quit date: 2000        Years since quittin.6      Smokeless tobacco: Never Used    Alcohol use: No    Drug use: Not Currently    Review of patient's family history indicates:  Problem: Cancer      Relation: Father          Age of Onset: (Not Specified)      No current outpatient medications on file.    Patient was instructed on the use of medications, either prescriptions or OTC and informed on when the appropriate follow up time period should be. In addition, patient was also instructed that should any acute worsening occur that they should notify this clinic asap or call 911.        Dizziness  This is a new problem. The current episode started more than 1 month ago. The problem occurs intermittently. The problem has been waxing and waning. Pertinent negatives include no abdominal pain, anorexia, arthralgias, change in bowel habit, chest pain, chills, coughing, fever, headaches, myalgias, nausea or rash. Nothing aggravates the symptoms. He has tried rest for the symptoms. The treatment provided moderate relief.       Review of Systems   Constitutional: Negative.  Negative for chills and fever.   HENT: Negative.  Negative for hearing loss.    Eyes: Negative.  Negative for blurred vision and double vision.   Respiratory: Negative.  Negative for cough and hemoptysis.    Cardiovascular:  "Negative.  Negative for chest pain and palpitations.   Gastrointestinal: Negative.  Negative for abdominal pain, anorexia, change in bowel habit, heartburn and nausea.   Genitourinary: Negative.  Negative for dysuria.   Musculoskeletal: Negative.  Negative for arthralgias and myalgias.   Skin: Negative.  Negative for rash.   Neurological: Positive for dizziness. Negative for tingling and headaches.   Endo/Heme/Allergies: Negative.  Does not bruise/bleed easily.   Psychiatric/Behavioral: Negative.  Negative for depression and suicidal ideas.   All other systems reviewed and are negative.         Objective:     /86   Pulse 77   Temp 36.6 °C (97.9 °F) (Temporal)   Resp 14   Ht 1.848 m (6' 0.75\")   Wt 89.4 kg (197 lb)   SpO2 96%   BMI 26.17 kg/m²      Physical Exam  Vitals and nursing note reviewed.   Constitutional:       General: He is not in acute distress.     Appearance: He is well-developed. He is not diaphoretic.   HENT:      Head: Normocephalic and atraumatic.      Mouth/Throat:      Pharynx: No oropharyngeal exudate.   Eyes:      Pupils: Pupils are equal, round, and reactive to light.   Cardiovascular:      Rate and Rhythm: Normal rate and regular rhythm.      Heart sounds: Normal heart sounds. No murmur. No friction rub. No gallop.    Pulmonary:      Effort: Pulmonary effort is normal. No respiratory distress.      Breath sounds: Normal breath sounds. No wheezing or rales.   Chest:      Chest wall: No tenderness.   Neurological:      Mental Status: He is alert and oriented to person, place, and time.   Psychiatric:         Behavior: Behavior normal.         Thought Content: Thought content normal.         Judgment: Judgment normal.                 Assessment/Plan:        1. Dizzy spells    - FREE THYROXINE; Future  - Comp Metabolic Panel; Future  - TRIIDOTHYRONINE; Future  - Lipid Profile; Future  - CBC WITHOUT DIFFERENTIAL; Future  - US-CAROTID DOPPLER BILAT; Future  - CT-HEAD W/O; Future    "

## 2021-03-10 LAB
ALBUMIN SERPL BCP-MCNC: 3.9 G/DL (ref 3.2–4.9)
ALBUMIN/GLOB SERPL: 1.2 G/DL
ALP SERPL-CCNC: 101 U/L (ref 30–99)
ALT SERPL-CCNC: 14 U/L (ref 2–50)
ANION GAP SERPL CALC-SCNC: 11 MMOL/L (ref 7–16)
AST SERPL-CCNC: 16 U/L (ref 12–45)
BILIRUB SERPL-MCNC: 0.4 MG/DL (ref 0.1–1.5)
BUN SERPL-MCNC: 12 MG/DL (ref 8–22)
CALCIUM SERPL-MCNC: 9.4 MG/DL (ref 8.5–10.5)
CHLORIDE SERPL-SCNC: 103 MMOL/L (ref 96–112)
CHOLEST SERPL-MCNC: 159 MG/DL (ref 100–199)
CO2 SERPL-SCNC: 26 MMOL/L (ref 20–33)
CREAT SERPL-MCNC: 0.78 MG/DL (ref 0.5–1.4)
ERYTHROCYTE [DISTWIDTH] IN BLOOD BY AUTOMATED COUNT: 50.7 FL (ref 35.9–50)
GLOBULIN SER CALC-MCNC: 3.3 G/DL (ref 1.9–3.5)
GLUCOSE SERPL-MCNC: 90 MG/DL (ref 65–99)
HCT VFR BLD AUTO: 45.5 % (ref 42–52)
HDLC SERPL-MCNC: 49 MG/DL
HGB BLD-MCNC: 14.2 G/DL (ref 14–18)
LDLC SERPL CALC-MCNC: 93 MG/DL
MCH RBC QN AUTO: 26.8 PG (ref 27–33)
MCHC RBC AUTO-ENTMCNC: 31.2 G/DL (ref 33.7–35.3)
MCV RBC AUTO: 86 FL (ref 81.4–97.8)
PLATELET # BLD AUTO: 231 K/UL (ref 164–446)
PMV BLD AUTO: 10.8 FL (ref 9–12.9)
POTASSIUM SERPL-SCNC: 5.2 MMOL/L (ref 3.6–5.5)
PROT SERPL-MCNC: 7.2 G/DL (ref 6–8.2)
RBC # BLD AUTO: 5.29 M/UL (ref 4.7–6.1)
SODIUM SERPL-SCNC: 140 MMOL/L (ref 135–145)
T3 SERPL-MCNC: 112 NG/DL (ref 60–181)
T4 FREE SERPL-MCNC: 1.04 NG/DL (ref 0.93–1.7)
TRIGL SERPL-MCNC: 86 MG/DL (ref 0–149)
WBC # BLD AUTO: 6.8 K/UL (ref 4.8–10.8)

## 2021-04-02 ENCOUNTER — HOSPITAL ENCOUNTER (OUTPATIENT)
Dept: RADIOLOGY | Facility: MEDICAL CENTER | Age: 61
End: 2021-04-02
Attending: FAMILY MEDICINE
Payer: COMMERCIAL

## 2021-04-02 ENCOUNTER — TELEPHONE (OUTPATIENT)
Dept: MEDICAL GROUP | Facility: PHYSICIAN GROUP | Age: 61
End: 2021-04-02

## 2021-04-02 DIAGNOSIS — R42 DIZZY SPELLS: ICD-10-CM

## 2021-04-02 PROCEDURE — 93880 EXTRACRANIAL BILAT STUDY: CPT

## 2021-04-02 PROCEDURE — 70450 CT HEAD/BRAIN W/O DYE: CPT

## 2021-04-02 PROCEDURE — 93880 EXTRACRANIAL BILAT STUDY: CPT | Mod: 26 | Performed by: INTERNAL MEDICINE

## 2021-04-02 NOTE — TELEPHONE ENCOUNTER
Phone Number Called: [phone] (home)       Call outcome: Left detailed message for patient. Informed to call back with any additional questions.    Message: Called to inform patient that his Carotid test was normal

## 2021-04-02 NOTE — TELEPHONE ENCOUNTER
Phone Number Called: [phone] (home)       Call outcome: Left detailed message for patient. Informed to call back with any additional questions.    Message: Called to inform patient that his Ct of his head was normal

## 2021-04-19 ENCOUNTER — OFFICE VISIT (OUTPATIENT)
Dept: MEDICAL GROUP | Facility: PHYSICIAN GROUP | Age: 61
End: 2021-04-19
Payer: COMMERCIAL

## 2021-04-19 VITALS
RESPIRATION RATE: 20 BRPM | BODY MASS INDEX: 25.94 KG/M2 | TEMPERATURE: 99 F | DIASTOLIC BLOOD PRESSURE: 70 MMHG | OXYGEN SATURATION: 96 % | WEIGHT: 195.7 LBS | SYSTOLIC BLOOD PRESSURE: 126 MMHG | HEART RATE: 74 BPM | HEIGHT: 73 IN

## 2021-04-19 DIAGNOSIS — H69.93 EUSTACHIAN TUBE DYSFUNCTION, BILATERAL: ICD-10-CM

## 2021-04-19 DIAGNOSIS — R42 DIZZY SPELLS: ICD-10-CM

## 2021-04-19 PROCEDURE — 99213 OFFICE O/P EST LOW 20 MIN: CPT | Performed by: FAMILY MEDICINE

## 2021-04-19 RX ORDER — MECLIZINE HYDROCHLORIDE 25 MG/1
25 TABLET ORAL 3 TIMES DAILY PRN
Qty: 90 TABLET | Refills: 3 | Status: SHIPPED | OUTPATIENT
Start: 2021-04-19 | End: 2022-04-05

## 2021-04-19 ASSESSMENT — ENCOUNTER SYMPTOMS
FEVER: 0
PALPITATIONS: 0
PSYCHIATRIC NEGATIVE: 1
HEMOPTYSIS: 0
GASTROINTESTINAL NEGATIVE: 1
DOUBLE VISION: 0
MYALGIAS: 0
CARDIOVASCULAR NEGATIVE: 1
EYES NEGATIVE: 1
CONSTITUTIONAL NEGATIVE: 1
NAUSEA: 0
HEADACHES: 0
RESPIRATORY NEGATIVE: 1
HEARTBURN: 0
MUSCULOSKELETAL NEGATIVE: 1
COUGH: 0
DIZZINESS: 1
BLURRED VISION: 0
TINGLING: 0
CHILLS: 0
BRUISES/BLEEDS EASILY: 0
DEPRESSION: 0

## 2021-04-19 ASSESSMENT — FIBROSIS 4 INDEX: FIB4 SCORE: 1.09

## 2021-04-19 NOTE — PROGRESS NOTES
Subjective:      Kris Lewis is a 60 y.o. male who presents with Dizziness (upon exertion)            1. Dizzy spells  Carotids and ct normal  - meclizine (ANTIVERT) 25 MG Tab; Take 1 tablet by mouth 3 times a day as needed for Dizziness.  Dispense: 90 tablet; Refill: 3  - REFERRAL TO ENT    2. Eustachian tube dysfunction, bilateral    - meclizine (ANTIVERT) 25 MG Tab; Take 1 tablet by mouth 3 times a day as needed for Dizziness.  Dispense: 90 tablet; Refill: 3  - REFERRAL TO ENT    No past medical history on file.  Past Surgical History:  No date: GASTRIC BYPASS LAPAROSCOPIC  Social History    Tobacco Use      Smoking status: Former Smoker        Years: 10.00        Quit date: 2000        Years since quittin.7      Smokeless tobacco: Never Used    Alcohol use: No    Drug use: Not Currently    Review of patient's family history indicates:  Problem: Cancer      Relation: Father          Age of Onset: (Not Specified)      Current Outpatient Medications: •  meclizine (ANTIVERT) 25 MG Tab, Take 1 tablet by mouth 3 times a day as needed for Dizziness., Disp: 90 tablet, Rfl: 3•  ibuprofen (MOTRIN) 200 MG Tab, Take 200 mg by mouth every 6 hours as needed., Disp: , Rfl:     Patient was instructed on the use of medications, either prescriptions or OTC and informed on when the appropriate follow up time period should be. In addition, patient was also instructed that should any acute worsening occur that they should notify this clinic asap or call 911.        Dizziness  This is a new problem. The current episode started more than 1 month ago. The problem occurs intermittently. The problem has been waxing and waning. Pertinent negatives include no chest pain, chills, coughing, fever, headaches, myalgias, nausea or rash. Nothing aggravates the symptoms. He has tried nothing for the symptoms. The treatment provided no relief.       Review of Systems   Constitutional: Negative.  Negative for chills and fever.  "  HENT: Negative.  Negative for hearing loss.    Eyes: Negative.  Negative for blurred vision and double vision.   Respiratory: Negative.  Negative for cough and hemoptysis.    Cardiovascular: Negative.  Negative for chest pain and palpitations.   Gastrointestinal: Negative.  Negative for heartburn and nausea.   Genitourinary: Negative.  Negative for dysuria.   Musculoskeletal: Negative.  Negative for myalgias.   Skin: Negative.  Negative for rash.   Neurological: Positive for dizziness. Negative for tingling and headaches.   Endo/Heme/Allergies: Negative.  Does not bruise/bleed easily.   Psychiatric/Behavioral: Negative.  Negative for depression and suicidal ideas.   All other systems reviewed and are negative.         Objective:     /70   Pulse 74   Temp 37.2 °C (99 °F) (Temporal)   Resp 20   Ht 1.848 m (6' 0.75\")   Wt 88.8 kg (195 lb 11.2 oz)   SpO2 96%   BMI 26.00 kg/m²      Physical Exam  Vitals and nursing note reviewed.   Constitutional:       General: He is not in acute distress.     Appearance: He is well-developed. He is not diaphoretic.   HENT:      Head: Normocephalic and atraumatic.      Mouth/Throat:      Pharynx: No oropharyngeal exudate.   Eyes:      Pupils: Pupils are equal, round, and reactive to light.   Cardiovascular:      Rate and Rhythm: Normal rate and regular rhythm.      Heart sounds: Normal heart sounds. No murmur. No friction rub. No gallop.    Pulmonary:      Effort: Pulmonary effort is normal. No respiratory distress.      Breath sounds: Normal breath sounds. No wheezing or rales.   Chest:      Chest wall: No tenderness.   Neurological:      Mental Status: He is alert and oriented to person, place, and time.   Psychiatric:         Behavior: Behavior normal.         Thought Content: Thought content normal.         Judgment: Judgment normal.                 Assessment/Plan:        1. Dizzy spells    - meclizine (ANTIVERT) 25 MG Tab; Take 1 tablet by mouth 3 times a day as " needed for Dizziness.  Dispense: 90 tablet; Refill: 3  - REFERRAL TO ENT    2. Eustachian tube dysfunction, bilateral    - meclizine (ANTIVERT) 25 MG Tab; Take 1 tablet by mouth 3 times a day as needed for Dizziness.  Dispense: 90 tablet; Refill: 3  - REFERRAL TO ENT

## 2022-04-05 ENCOUNTER — OFFICE VISIT (OUTPATIENT)
Dept: MEDICAL GROUP | Facility: PHYSICIAN GROUP | Age: 62
End: 2022-04-05
Payer: COMMERCIAL

## 2022-04-05 VITALS
BODY MASS INDEX: 25.98 KG/M2 | OXYGEN SATURATION: 96 % | WEIGHT: 196 LBS | HEIGHT: 73 IN | RESPIRATION RATE: 12 BRPM | DIASTOLIC BLOOD PRESSURE: 78 MMHG | TEMPERATURE: 98.3 F | HEART RATE: 89 BPM | SYSTOLIC BLOOD PRESSURE: 132 MMHG

## 2022-04-05 DIAGNOSIS — M23.91 DERANGEMENT, KNEE INTERNAL, RIGHT: ICD-10-CM

## 2022-04-05 PROCEDURE — 99214 OFFICE O/P EST MOD 30 MIN: CPT | Performed by: FAMILY MEDICINE

## 2022-04-05 ASSESSMENT — ENCOUNTER SYMPTOMS
COUGH: 0
PALPITATIONS: 0
TINGLING: 0
GASTROINTESTINAL NEGATIVE: 1
NEUROLOGICAL NEGATIVE: 1
NAUSEA: 0
DEPRESSION: 0
RESPIRATORY NEGATIVE: 1
DIZZINESS: 0
CARDIOVASCULAR NEGATIVE: 1
CHILLS: 0
HEMOPTYSIS: 0
PSYCHIATRIC NEGATIVE: 1
DOUBLE VISION: 0
BLURRED VISION: 0
EYES NEGATIVE: 1
HEARTBURN: 0
CONSTITUTIONAL NEGATIVE: 1
MYALGIAS: 0
FEVER: 0
BRUISES/BLEEDS EASILY: 0
HEADACHES: 0

## 2022-04-05 ASSESSMENT — PATIENT HEALTH QUESTIONNAIRE - PHQ9: CLINICAL INTERPRETATION OF PHQ2 SCORE: 0

## 2022-04-05 ASSESSMENT — FIBROSIS 4 INDEX: FIB4 SCORE: 1.11

## 2022-04-06 NOTE — PROGRESS NOTES
Subjective     Kris Lewis is a 61 y.o. male who presents with Knee Pain            1. Derangement, knee internal, right  3 years ago had large fb in right knee needing removal. Some pain since then and then recently with pain and locking and feeling like it is unstable. Also feels like increase in crepitus, not better with exercises, rest or nsaids   MR-KNEE-W/O RIGHT; Future    No past medical history on file.  Past Surgical History:  No date: GASTRIC BYPASS LAPAROSCOPIC  Social History    Tobacco Use      Smoking status: Former Smoker        Years: 10.00        Quit date: 2000        Years since quittin.7      Smokeless tobacco: Never Used    Vaping Use      Vaping Use: Never used    Alcohol use: No    Drug use: Not Currently    Review of patient's family history indicates:  Problem: Cancer      Relation: Father          Age of Onset: (Not Specified)      No current outpatient medications on file.    Patient was instructed on the use of medications, either prescriptions or OTC and informed on when the appropriate follow up time period should be. In addition, patient was also instructed that should any acute worsening occur that they should notify this clinic asap or call 911.          Review of Systems   Constitutional: Negative.  Negative for chills and fever.   HENT: Negative.  Negative for hearing loss.    Eyes: Negative.  Negative for blurred vision and double vision.   Respiratory: Negative.  Negative for cough and hemoptysis.    Cardiovascular: Negative.  Negative for chest pain and palpitations.   Gastrointestinal: Negative.  Negative for heartburn and nausea.   Genitourinary: Negative.  Negative for dysuria.   Musculoskeletal: Positive for joint pain. Negative for myalgias.   Skin: Negative.  Negative for rash.   Neurological: Negative.  Negative for dizziness, tingling and headaches.   Endo/Heme/Allergies: Negative.  Does not bruise/bleed easily.   Psychiatric/Behavioral: Negative.   "Negative for depression and suicidal ideas.   All other systems reviewed and are negative.             Objective     /78 (BP Location: Left arm, Patient Position: Sitting, BP Cuff Size: Adult)   Pulse 89   Temp 36.8 °C (98.3 °F) (Temporal)   Resp 12   Ht 1.848 m (6' 0.75\")   Wt 88.9 kg (196 lb)   SpO2 96%   BMI 26.04 kg/m²      Physical Exam  Vitals and nursing note reviewed.   Constitutional:       General: He is not in acute distress.     Appearance: He is well-developed. He is not diaphoretic.   HENT:      Head: Normocephalic and atraumatic.      Mouth/Throat:      Pharynx: No oropharyngeal exudate.   Eyes:      Pupils: Pupils are equal, round, and reactive to light.   Cardiovascular:      Rate and Rhythm: Normal rate and regular rhythm.      Heart sounds: Normal heart sounds. No murmur heard.    No friction rub. No gallop.   Pulmonary:      Effort: Pulmonary effort is normal. No respiratory distress.      Breath sounds: Normal breath sounds. No wheezing or rales.   Chest:      Chest wall: No tenderness.   Musculoskeletal:      Right knee: Abnormal meniscus.      Comments: Large amount of crepitus   Neurological:      Mental Status: He is alert and oriented to person, place, and time.   Psychiatric:         Behavior: Behavior normal.         Thought Content: Thought content normal.         Judgment: Judgment normal.                             Assessment & Plan        1. Derangement, knee internal, right    - MR-KNEE-W/O RIGHT; Future                "

## 2022-08-09 ENCOUNTER — OFFICE VISIT (OUTPATIENT)
Dept: MEDICAL GROUP | Facility: PHYSICIAN GROUP | Age: 62
End: 2022-08-09
Payer: COMMERCIAL

## 2022-08-09 VITALS
HEIGHT: 73 IN | DIASTOLIC BLOOD PRESSURE: 68 MMHG | WEIGHT: 178 LBS | HEART RATE: 97 BPM | SYSTOLIC BLOOD PRESSURE: 124 MMHG | OXYGEN SATURATION: 96 % | TEMPERATURE: 98.6 F | RESPIRATION RATE: 12 BRPM | BODY MASS INDEX: 23.59 KG/M2

## 2022-08-09 DIAGNOSIS — L98.9 SKIN LESIONS: ICD-10-CM

## 2022-08-09 PROCEDURE — 99213 OFFICE O/P EST LOW 20 MIN: CPT | Performed by: FAMILY MEDICINE

## 2022-08-09 ASSESSMENT — FIBROSIS 4 INDEX: FIB4 SCORE: 1.11

## 2022-08-09 NOTE — PROGRESS NOTES
Over 50% of this 25 minute visit ( all time was face to face) was spent on counseling and coordination of care for the problem of  1. Skin lesions  Multiple nevi on back, unknown is any have changed  - Referral to Dermatology    No past medical history on file.  Past Surgical History:   Procedure Laterality Date   • GASTRIC BYPASS LAPAROSCOPIC       Social History     Tobacco Use   • Smoking status: Former Smoker     Years: 10.00     Quit date: 2000     Years since quittin.0   • Smokeless tobacco: Never Used   Vaping Use   • Vaping Use: Never used   Substance Use Topics   • Alcohol use: No   • Drug use: Not Currently     Family History   Problem Relation Age of Onset   • Cancer Father        No current outpatient medications on file.    Patient was instructed on the use of medications, either prescriptions or OTC and informed on when the appropriate follow up time period should be. In addition, patient was also instructed that should any acute worsening occur that they should notify this clinic asap or call 911.

## 2022-09-15 ENCOUNTER — HOSPITAL ENCOUNTER (OUTPATIENT)
Dept: RADIOLOGY | Facility: MEDICAL CENTER | Age: 62
End: 2022-09-15
Attending: FAMILY MEDICINE
Payer: COMMERCIAL

## 2022-09-15 DIAGNOSIS — M23.91 DERANGEMENT, KNEE INTERNAL, RIGHT: ICD-10-CM

## 2022-09-15 PROCEDURE — 73721 MRI JNT OF LWR EXTRE W/O DYE: CPT | Mod: RT

## 2022-09-19 ENCOUNTER — OFFICE VISIT (OUTPATIENT)
Dept: MEDICAL GROUP | Facility: PHYSICIAN GROUP | Age: 62
End: 2022-09-19
Payer: COMMERCIAL

## 2022-09-19 VITALS
HEIGHT: 73 IN | SYSTOLIC BLOOD PRESSURE: 124 MMHG | TEMPERATURE: 97.1 F | BODY MASS INDEX: 24.28 KG/M2 | HEART RATE: 61 BPM | RESPIRATION RATE: 16 BRPM | WEIGHT: 183.2 LBS | DIASTOLIC BLOOD PRESSURE: 62 MMHG | OXYGEN SATURATION: 97 %

## 2022-09-19 DIAGNOSIS — S83.8X9A: ICD-10-CM

## 2022-09-19 DIAGNOSIS — S83.511D RUPTURE OF ANTERIOR CRUCIATE LIGAMENT OF RIGHT KNEE, SUBSEQUENT ENCOUNTER: ICD-10-CM

## 2022-09-19 PROCEDURE — 99214 OFFICE O/P EST MOD 30 MIN: CPT | Performed by: FAMILY MEDICINE

## 2022-09-19 ASSESSMENT — ENCOUNTER SYMPTOMS
HEMOPTYSIS: 0
MYALGIAS: 0
RESPIRATORY NEGATIVE: 1
BRUISES/BLEEDS EASILY: 0
CARDIOVASCULAR NEGATIVE: 1
DEPRESSION: 0
NAUSEA: 0
PSYCHIATRIC NEGATIVE: 1
DOUBLE VISION: 0
CHILLS: 0
FEVER: 0
BLURRED VISION: 0
NEUROLOGICAL NEGATIVE: 1
HEARTBURN: 0
TINGLING: 0
CONSTITUTIONAL NEGATIVE: 1
HEADACHES: 0
DIZZINESS: 0
PALPITATIONS: 0
GASTROINTESTINAL NEGATIVE: 1
COUGH: 0
EYES NEGATIVE: 1

## 2022-09-19 ASSESSMENT — FIBROSIS 4 INDEX: FIB4 SCORE: 1.11

## 2022-09-19 NOTE — PROGRESS NOTES
Subjective     Kris Lewis is a 61 y.o. male who presents with Follow-Up (MRI of knee)            Mri with acl partial tear and tears in both medial and lateral meniscus and OA in knee  Will send to ortho for eval and surgery    1. Meniscal injury, unspecified laterality, initial encounter     Referral to Orthopedics    2. Rupture of anterior cruciate ligament of right knee, subsequent encounter      Referral to Orthopedics    History reviewed. No pertinent past medical history.  Past Surgical History:  No date: GASTRIC BYPASS LAPAROSCOPIC  Social History    Tobacco Use      Smoking status: Former        Years: 10.00        Types: Cigarettes        Quit date: 2000        Years since quittin.1      Smokeless tobacco: Never    Vaping Use      Vaping Use: Never used    Alcohol use: No    Drug use: Not Currently    Review of patient's family history indicates:  Problem: Cancer      Relation: Father          Age of Onset: (Not Specified)      No current outpatient medications on file.    Patient was instructed on the use of medications, either prescriptions or OTC and informed on when the appropriate follow up time period should be. In addition, patient was also instructed that should any acute worsening occur that they should notify this clinic asap or call 911.            Review of Systems   Constitutional: Negative.  Negative for chills and fever.   HENT: Negative.  Negative for hearing loss.    Eyes: Negative.  Negative for blurred vision and double vision.   Respiratory: Negative.  Negative for cough and hemoptysis.    Cardiovascular: Negative.  Negative for chest pain and palpitations.   Gastrointestinal: Negative.  Negative for heartburn and nausea.   Genitourinary: Negative.  Negative for dysuria.   Musculoskeletal:  Positive for joint pain. Negative for myalgias.   Skin: Negative.  Negative for rash.   Neurological: Negative.  Negative for dizziness, tingling and headaches.   Endo/Heme/Allergies:  "Negative.  Does not bruise/bleed easily.   Psychiatric/Behavioral: Negative.  Negative for depression and suicidal ideas.    All other systems reviewed and are negative.           Objective     /62   Pulse 61   Temp 36.2 °C (97.1 °F) (Temporal)   Resp 16   Ht 1.848 m (6' 0.75\")   Wt 83.1 kg (183 lb 3.2 oz)   SpO2 97%   BMI 24.34 kg/m²      Physical Exam  Vitals and nursing note reviewed.   Constitutional:       General: He is not in acute distress.     Appearance: He is well-developed. He is not diaphoretic.   HENT:      Head: Normocephalic and atraumatic.      Mouth/Throat:      Pharynx: No oropharyngeal exudate.   Eyes:      Pupils: Pupils are equal, round, and reactive to light.   Cardiovascular:      Rate and Rhythm: Normal rate and regular rhythm.      Heart sounds: Normal heart sounds. No murmur heard.    No friction rub. No gallop.   Pulmonary:      Effort: Pulmonary effort is normal. No respiratory distress.      Breath sounds: Normal breath sounds. No wheezing or rales.   Chest:      Chest wall: No tenderness.   Musculoskeletal:      Right knee: Abnormal meniscus.   Neurological:      Mental Status: He is alert and oriented to person, place, and time.   Psychiatric:         Behavior: Behavior normal.         Thought Content: Thought content normal.         Judgment: Judgment normal.                           Assessment & Plan        1. Meniscal injury, unspecified laterality, initial encounter    - Referral to Orthopedics    2. Rupture of anterior cruciate ligament of right knee, subsequent encounter    - Referral to Orthopedics                  "

## 2022-09-22 ENCOUNTER — OFFICE VISIT (OUTPATIENT)
Dept: DERMATOLOGY | Facility: IMAGING CENTER | Age: 62
End: 2022-09-22
Payer: COMMERCIAL

## 2022-09-22 DIAGNOSIS — L82.1 SK (SEBORRHEIC KERATOSIS): ICD-10-CM

## 2022-09-22 DIAGNOSIS — D22.9 MULTIPLE NEVI: ICD-10-CM

## 2022-09-22 DIAGNOSIS — L81.4 LENTIGINES: ICD-10-CM

## 2022-09-22 DIAGNOSIS — D18.01 CHERRY ANGIOMA: ICD-10-CM

## 2022-09-22 PROCEDURE — 99212 OFFICE O/P EST SF 10 MIN: CPT | Performed by: NURSE PRACTITIONER

## 2022-09-22 NOTE — PROGRESS NOTES
DERMATOLOGY NOTE  NEW VISIT  Procedure Room       Chief complaint: Skin Lesion (back)     HPI/location: back  Time present: 3yrs  Painful lesion: No  Itching lesion: No      History of skin cancer: No  History of precancers/actinic keratoses: No  History of biopsies:No  History of blistering/severe sunburns:Yes, Details: Adulthood  Family history of skin cancer:No  Family history of atypical moles:No      No Known Allergies     MEDICATIONS:  Medications relevant to specialty reviewed.     REVIEW OF SYSTEMS:   Positive for skin (see HPI)  Negative for fevers and chills       EXAM:  There were no vitals taken for this visit.  Constitutional: Well-developed, well-nourished, and in no distress.     A focused skin exam was performed including the affected areas of the back. Notable findings on exam today listed below and/or in assessment/plan.     -sun exposed skin of trunk with scattered clinically benign light brown reticulated macules all of which were morphologically similar and none of which were suspicious for skin cancer today on exam  Several light brown medium and dark brown stuck-on waxy papules scattered on the trunk/back  Multiple light brown medium and dark brown macules papules scattered over the trunk/back--All with benign-appearing pigment network patterns on dermoscopy  Very few scattered 1-3mm bright red macules and thin papules on the trunk/back    IMPRESSION / PLAN:    1. Lentigines  - Benign-appearing nature of lesions discussed during exam.   - Advised to continue to monitor for any return to clinic for new or concerning changes.      2. Multiple nevi  - Benign-appearing nature of lesions discussed during exam.   - Advised to continue to monitor for any return to clinic for new or concerning changes.  - ABCDE's of melanoma discussed/handout given      3. SK (seborrheic keratosis)  - Benign-appearing nature of lesions discussed during exam.   - Advised to continue to monitor for any return to clinic  for new or concerning changes.      4. Cherry angioma  - Benign-appearing nature of lesions discussed during exam.   - Advised to continue to monitor for any return to clinic for new or concerning changes.                Please note that this dictation was created using voice recognition software. I have made every reasonable attempt to correct obvious errors, but I expect that there are errors of grammar and possibly content that I did not discover before finalizing the note.      Return to clinic in: Return for PRN. and as needed for any new or changing skin lesions.

## 2022-11-03 ENCOUNTER — PRE-ADMISSION TESTING (OUTPATIENT)
Dept: ADMISSIONS | Facility: MEDICAL CENTER | Age: 62
End: 2022-11-03
Attending: STUDENT IN AN ORGANIZED HEALTH CARE EDUCATION/TRAINING PROGRAM
Payer: COMMERCIAL

## 2022-11-03 VITALS — HEIGHT: 72 IN | BODY MASS INDEX: 24.85 KG/M2

## 2022-11-03 RX ORDER — IBUPROFEN 200 MG
400 TABLET ORAL EVERY 6 HOURS PRN
Status: ON HOLD | COMMUNITY
End: 2023-01-03

## 2022-11-03 NOTE — PREPROCEDURE INSTRUCTIONS
Pt preadmitted via phone, instructions emailed. Questions answered. Pt instructed to continue regularly prescribed meds through day of procedure. Per anesthesia protocol instructed to take these medications the day of procedure- NA-METs score >4.

## 2022-11-23 ENCOUNTER — ANESTHESIA EVENT (OUTPATIENT)
Dept: SURGERY | Facility: MEDICAL CENTER | Age: 62
End: 2022-11-23
Payer: COMMERCIAL

## 2022-11-23 ENCOUNTER — ANESTHESIA (OUTPATIENT)
Dept: SURGERY | Facility: MEDICAL CENTER | Age: 62
End: 2022-11-23
Payer: COMMERCIAL

## 2022-11-23 ENCOUNTER — APPOINTMENT (OUTPATIENT)
Dept: RADIOLOGY | Facility: MEDICAL CENTER | Age: 62
End: 2022-11-23
Attending: STUDENT IN AN ORGANIZED HEALTH CARE EDUCATION/TRAINING PROGRAM
Payer: COMMERCIAL

## 2022-11-23 ENCOUNTER — HOSPITAL ENCOUNTER (OUTPATIENT)
Facility: MEDICAL CENTER | Age: 62
End: 2022-11-23
Attending: STUDENT IN AN ORGANIZED HEALTH CARE EDUCATION/TRAINING PROGRAM | Admitting: STUDENT IN AN ORGANIZED HEALTH CARE EDUCATION/TRAINING PROGRAM
Payer: COMMERCIAL

## 2022-11-23 VITALS
TEMPERATURE: 97.5 F | DIASTOLIC BLOOD PRESSURE: 82 MMHG | RESPIRATION RATE: 16 BRPM | WEIGHT: 185.19 LBS | SYSTOLIC BLOOD PRESSURE: 141 MMHG | HEART RATE: 62 BPM | BODY MASS INDEX: 25.08 KG/M2 | HEIGHT: 72 IN | OXYGEN SATURATION: 92 %

## 2022-11-23 PROCEDURE — 160028 HCHG SURGERY MINUTES - 1ST 30 MINS LEVEL 3: Performed by: STUDENT IN AN ORGANIZED HEALTH CARE EDUCATION/TRAINING PROGRAM

## 2022-11-23 PROCEDURE — 160036 HCHG PACU - EA ADDL 30 MINS PHASE I: Performed by: STUDENT IN AN ORGANIZED HEALTH CARE EDUCATION/TRAINING PROGRAM

## 2022-11-23 PROCEDURE — 160039 HCHG SURGERY MINUTES - EA ADDL 1 MIN LEVEL 3: Performed by: STUDENT IN AN ORGANIZED HEALTH CARE EDUCATION/TRAINING PROGRAM

## 2022-11-23 PROCEDURE — 160002 HCHG RECOVERY MINUTES (STAT): Performed by: STUDENT IN AN ORGANIZED HEALTH CARE EDUCATION/TRAINING PROGRAM

## 2022-11-23 PROCEDURE — 700105 HCHG RX REV CODE 258: Performed by: ANESTHESIOLOGY

## 2022-11-23 PROCEDURE — 160048 HCHG OR STATISTICAL LEVEL 1-5: Performed by: STUDENT IN AN ORGANIZED HEALTH CARE EDUCATION/TRAINING PROGRAM

## 2022-11-23 PROCEDURE — 160047 HCHG PACU  - EA ADDL 30 MINS PHASE II: Performed by: STUDENT IN AN ORGANIZED HEALTH CARE EDUCATION/TRAINING PROGRAM

## 2022-11-23 PROCEDURE — 700111 HCHG RX REV CODE 636 W/ 250 OVERRIDE (IP): Performed by: ANESTHESIOLOGY

## 2022-11-23 PROCEDURE — A9270 NON-COVERED ITEM OR SERVICE: HCPCS | Performed by: ANESTHESIOLOGY

## 2022-11-23 PROCEDURE — 700102 HCHG RX REV CODE 250 W/ 637 OVERRIDE(OP): Performed by: ANESTHESIOLOGY

## 2022-11-23 PROCEDURE — 01400 ANES OPN/ARTHRS KNEE JT NOS: CPT | Performed by: ANESTHESIOLOGY

## 2022-11-23 PROCEDURE — 700101 HCHG RX REV CODE 250: Performed by: ANESTHESIOLOGY

## 2022-11-23 PROCEDURE — 160035 HCHG PACU - 1ST 60 MINS PHASE I: Performed by: STUDENT IN AN ORGANIZED HEALTH CARE EDUCATION/TRAINING PROGRAM

## 2022-11-23 PROCEDURE — 160046 HCHG PACU - 1ST 60 MINS PHASE II: Performed by: STUDENT IN AN ORGANIZED HEALTH CARE EDUCATION/TRAINING PROGRAM

## 2022-11-23 PROCEDURE — 160009 HCHG ANES TIME/MIN: Performed by: STUDENT IN AN ORGANIZED HEALTH CARE EDUCATION/TRAINING PROGRAM

## 2022-11-23 PROCEDURE — 160025 RECOVERY II MINUTES (STATS): Performed by: STUDENT IN AN ORGANIZED HEALTH CARE EDUCATION/TRAINING PROGRAM

## 2022-11-23 RX ORDER — DEXAMETHASONE SODIUM PHOSPHATE 4 MG/ML
INJECTION, SOLUTION INTRA-ARTICULAR; INTRALESIONAL; INTRAMUSCULAR; INTRAVENOUS; SOFT TISSUE PRN
Status: DISCONTINUED | OUTPATIENT
Start: 2022-11-23 | End: 2022-11-23 | Stop reason: SURG

## 2022-11-23 RX ORDER — GABAPENTIN 300 MG/1
300 CAPSULE ORAL ONCE
Status: DISCONTINUED | OUTPATIENT
Start: 2022-11-23 | End: 2022-11-23 | Stop reason: HOSPADM

## 2022-11-23 RX ORDER — DIPHENHYDRAMINE HYDROCHLORIDE 50 MG/ML
12.5 INJECTION INTRAMUSCULAR; INTRAVENOUS
Status: DISCONTINUED | OUTPATIENT
Start: 2022-11-23 | End: 2022-11-23 | Stop reason: HOSPADM

## 2022-11-23 RX ORDER — ONDANSETRON 2 MG/ML
4 INJECTION INTRAMUSCULAR; INTRAVENOUS
Status: DISCONTINUED | OUTPATIENT
Start: 2022-11-23 | End: 2022-11-23 | Stop reason: HOSPADM

## 2022-11-23 RX ORDER — OXYCODONE HCL 5 MG/5 ML
5 SOLUTION, ORAL ORAL
Status: COMPLETED | OUTPATIENT
Start: 2022-11-23 | End: 2022-11-23

## 2022-11-23 RX ORDER — HALOPERIDOL 5 MG/ML
1 INJECTION INTRAMUSCULAR
Status: DISCONTINUED | OUTPATIENT
Start: 2022-11-23 | End: 2022-11-23 | Stop reason: HOSPADM

## 2022-11-23 RX ORDER — ACETAMINOPHEN 500 MG
1000 TABLET ORAL ONCE
Status: DISCONTINUED | OUTPATIENT
Start: 2022-11-23 | End: 2022-11-23 | Stop reason: HOSPADM

## 2022-11-23 RX ORDER — CEFAZOLIN SODIUM 1 G/3ML
INJECTION, POWDER, FOR SOLUTION INTRAMUSCULAR; INTRAVENOUS PRN
Status: DISCONTINUED | OUTPATIENT
Start: 2022-11-23 | End: 2022-11-23 | Stop reason: SURG

## 2022-11-23 RX ORDER — MIDAZOLAM HYDROCHLORIDE 1 MG/ML
INJECTION INTRAMUSCULAR; INTRAVENOUS PRN
Status: DISCONTINUED | OUTPATIENT
Start: 2022-11-23 | End: 2022-11-23 | Stop reason: SURG

## 2022-11-23 RX ORDER — SODIUM CHLORIDE, SODIUM LACTATE, POTASSIUM CHLORIDE, CALCIUM CHLORIDE 600; 310; 30; 20 MG/100ML; MG/100ML; MG/100ML; MG/100ML
INJECTION, SOLUTION INTRAVENOUS
Status: DISCONTINUED | OUTPATIENT
Start: 2022-11-23 | End: 2022-11-23 | Stop reason: SURG

## 2022-11-23 RX ORDER — HYDROMORPHONE HYDROCHLORIDE 1 MG/ML
0.2 INJECTION, SOLUTION INTRAMUSCULAR; INTRAVENOUS; SUBCUTANEOUS
Status: DISCONTINUED | OUTPATIENT
Start: 2022-11-23 | End: 2022-11-23 | Stop reason: HOSPADM

## 2022-11-23 RX ORDER — METOPROLOL TARTRATE 1 MG/ML
1 INJECTION, SOLUTION INTRAVENOUS
Status: DISCONTINUED | OUTPATIENT
Start: 2022-11-23 | End: 2022-11-23 | Stop reason: HOSPADM

## 2022-11-23 RX ORDER — OXYCODONE HCL 5 MG/5 ML
10 SOLUTION, ORAL ORAL
Status: COMPLETED | OUTPATIENT
Start: 2022-11-23 | End: 2022-11-23

## 2022-11-23 RX ORDER — HYDROMORPHONE HYDROCHLORIDE 1 MG/ML
0.1 INJECTION, SOLUTION INTRAMUSCULAR; INTRAVENOUS; SUBCUTANEOUS
Status: DISCONTINUED | OUTPATIENT
Start: 2022-11-23 | End: 2022-11-23 | Stop reason: HOSPADM

## 2022-11-23 RX ORDER — LIDOCAINE HYDROCHLORIDE 20 MG/ML
INJECTION, SOLUTION EPIDURAL; INFILTRATION; INTRACAUDAL; PERINEURAL PRN
Status: DISCONTINUED | OUTPATIENT
Start: 2022-11-23 | End: 2022-11-23 | Stop reason: SURG

## 2022-11-23 RX ORDER — HYDRALAZINE HYDROCHLORIDE 20 MG/ML
5 INJECTION INTRAMUSCULAR; INTRAVENOUS
Status: DISCONTINUED | OUTPATIENT
Start: 2022-11-23 | End: 2022-11-23 | Stop reason: HOSPADM

## 2022-11-23 RX ORDER — HYDROMORPHONE HYDROCHLORIDE 1 MG/ML
0.4 INJECTION, SOLUTION INTRAMUSCULAR; INTRAVENOUS; SUBCUTANEOUS
Status: DISCONTINUED | OUTPATIENT
Start: 2022-11-23 | End: 2022-11-23 | Stop reason: HOSPADM

## 2022-11-23 RX ORDER — SODIUM CHLORIDE, SODIUM LACTATE, POTASSIUM CHLORIDE, CALCIUM CHLORIDE 600; 310; 30; 20 MG/100ML; MG/100ML; MG/100ML; MG/100ML
INJECTION, SOLUTION INTRAVENOUS CONTINUOUS
Status: DISCONTINUED | OUTPATIENT
Start: 2022-11-23 | End: 2022-11-23 | Stop reason: HOSPADM

## 2022-11-23 RX ADMIN — FENTANYL CITRATE 25 MCG: 50 INJECTION, SOLUTION INTRAMUSCULAR; INTRAVENOUS at 11:37

## 2022-11-23 RX ADMIN — OXYCODONE HYDROCHLORIDE 10 MG: 5 SOLUTION ORAL at 13:05

## 2022-11-23 RX ADMIN — PROPOFOL 180 MG: 10 INJECTION, EMULSION INTRAVENOUS at 11:39

## 2022-11-23 RX ADMIN — MIDAZOLAM HYDROCHLORIDE 2 MG: 1 INJECTION, SOLUTION INTRAMUSCULAR; INTRAVENOUS at 11:37

## 2022-11-23 RX ADMIN — FENTANYL CITRATE 50 MCG: 50 INJECTION, SOLUTION INTRAMUSCULAR; INTRAVENOUS at 13:05

## 2022-11-23 RX ADMIN — PROPOFOL 20 MG: 10 INJECTION, EMULSION INTRAVENOUS at 12:18

## 2022-11-23 RX ADMIN — FENTANYL CITRATE 25 MCG: 50 INJECTION, SOLUTION INTRAMUSCULAR; INTRAVENOUS at 12:17

## 2022-11-23 RX ADMIN — SODIUM CHLORIDE, POTASSIUM CHLORIDE, SODIUM LACTATE AND CALCIUM CHLORIDE: 600; 310; 30; 20 INJECTION, SOLUTION INTRAVENOUS at 11:34

## 2022-11-23 RX ADMIN — LIDOCAINE HYDROCHLORIDE 50 MG: 20 INJECTION, SOLUTION EPIDURAL; INFILTRATION; INTRACAUDAL at 11:39

## 2022-11-23 RX ADMIN — DEXAMETHASONE SODIUM PHOSPHATE 8 MG: 4 INJECTION, SOLUTION INTRA-ARTICULAR; INTRALESIONAL; INTRAMUSCULAR; INTRAVENOUS; SOFT TISSUE at 12:05

## 2022-11-23 RX ADMIN — FENTANYL CITRATE 25 MCG: 50 INJECTION, SOLUTION INTRAMUSCULAR; INTRAVENOUS at 12:08

## 2022-11-23 RX ADMIN — CEFAZOLIN 2 G: 330 INJECTION, POWDER, FOR SOLUTION INTRAMUSCULAR; INTRAVENOUS at 11:55

## 2022-11-23 RX ADMIN — FENTANYL CITRATE 25 MCG: 50 INJECTION, SOLUTION INTRAMUSCULAR; INTRAVENOUS at 11:59

## 2022-11-23 ASSESSMENT — FIBROSIS 4 INDEX: FIB4 SCORE: 1.11

## 2022-11-23 NOTE — H&P
Surgery Orthopedic History & Physical Note    Date  2022    Primary Care Physician  Dima Stevens M.D.    CC  Right knee pain    HPI  This is a 61 y.o. male who presented with mechanical right knee pain.  He feels crunching popping and catching constantly in the knee.  The mechanical symptoms are bothering him.  He has known medial compartment mild to moderate OA but states that he has only mild medial lateral joint line pain but more so  in the suprapatellar pouch with catching.    Past Medical History:   Diagnosis Date    Arthritis     Right knee    Dental disorder     full set    Right knee pain 2022       Past Surgical History:   Procedure Laterality Date    GASTRIC BYPASS LAPAROSCOPIC      KNEE ARTHROSCOPY Left        Current Facility-Administered Medications   Medication Dose Route Frequency Provider Last Rate Last Admin    gabapentin (NEURONTIN) capsule 300 mg  300 mg Oral Once Ángel Alfaro M.D.        acetaminophen (TYLENOL) tablet 1,000 mg  1,000 mg Oral Once Ángel Alfaro M.D.           Social History     Socioeconomic History    Marital status:      Spouse name: Not on file    Number of children: Not on file    Years of education: Not on file    Highest education level: Not on file   Occupational History    Not on file   Tobacco Use    Smoking status: Every Day     Packs/day: 0.50     Years: 10.00     Pack years: 5.00     Types: Cigarettes     Last attempt to quit: 2000     Years since quittin.3    Smokeless tobacco: Never   Vaping Use    Vaping Use: Never used   Substance and Sexual Activity    Alcohol use: No    Drug use: Not Currently    Sexual activity: Yes     Partners: Female     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    Review of Systems  Negative except for that noted in the HPI    Physical Exam  Keith:  Full range of motion  Palpable crepitus and mobile loose bodies in the suprapatellar pouch  Mild medial lateral joint line tenderness  Knee otherwise stable      Vital Signs  Blood Pressure: (!) 152/91   Temperature: 36.9 °C (98.4 °F)   Pulse: 78   Respiration: 18   Pulse Oximetry: 98 %       Labs:                    Radiology:  MRI personally reviewed demonstrating multiple loose bodies in the suprapatellar pouch, moderate chondromalacia of the medial compartment with degenerative meniscal tearing minimal chondromalacia of the lateral compartment with degenerative meniscal tearing    Assessment/Plan:  61-year-old male with mechanical right knee symptoms with degenerative medial lateral meniscus tearing and several loose bodies in the suprapatellar pouch causing mechanical symptoms    Had a discussion about operative versus nonoperative treatment.  We discussed that he does have some medial compartment OA however this is not the most bothersome for him it is more the loose bodies.  We discussed arthroscopic removal of the loose bodies versus open and arthroscopic medial lateral meniscectomy.  We discussed risk benefits alternatives and he wished to proceed with surgery.

## 2022-11-23 NOTE — DISCHARGE INSTRUCTIONS
If any questions arise, call your provider.  If your provider is not available, please feel free to call the [facility] at [phone] to Expect Post Anesthesia    Rest and take it easy for the first 24 hours.  A responsible adult is recommended to remain with you during that time.  It is normal to feel sleepy.  We encourage you to not do anything that requires balance, judgment or coordination.    FOR 24 HOURS DO NOT:  Drive, operate machinery or run household appliances.  Drink beer or alcoholic beverages.  Make important decisions or sign legal documents.    To avoid nausea, slowly advance diet as tolerated, avoiding spicy or greasy foods for the first day.  Add more substantial food to your diet according to your provider's instructions.  Babies can be fed formula or breast milk as soon as they are hungry.  INCREASE FLUIDS AND FIBER TO AVOID CONSTIPATION.    MILD FLU-LIKE SYMPTOMS ARE NORMAL.  YOU MAY EXPERIENCE GENERALIZED MUSCLE ACHES, THROAT IRRITATION, HEADACHE AND/OR SOME NAUSEA.    .    MEDICATIONS: Resume taking daily medication.  Take prescribed pain medication with food.  If no medication is prescribed, you may take non-aspirin pain medication if needed.  PAIN MEDICATION CAN BE VERY CONSTIPATING.  Take a stool softener or laxative such as senokot, pericolace, or milk of magnesia if needed.    Last pain medication given at 1:03 P.M.Oxycodone 10 mg    Please see attached for specific instructions per Dr. Lilly

## 2022-11-23 NOTE — OP REPORT
Operative report    PreOp Diagnosis:  1.  Multiple loose body suprapatellar patch  2.  Right knee medial meniscus tear  3.  Right knee lateral meniscus tear  4.  Significant lateral chondromalacia      PostOp Diagnosis: Same      Procedure(s):  1.  Arthroscopic medial lateral meniscectomy of the right knee  2.  Arthroscopic removal of loose bodies 13 of all greater than 2 cm-please add a 22 modifier onto the the removal loose body code due to the sheer number of loose bodies and the size involving this greatly increased the operative time for loose body removal hence the difficult modifier code associate with it.    Surgeon(s):  Jordan Lilly M.D.    Anesthesiologist/Type of Anesthesia:  Anesthesiologist: Ángel Alfaro M.D./General    Surgical Staff:  Circulator: Beth Connors  Scrub Person: Tony Kerwin    Specimens removed if any:  None    Estimated Blood Loss: Minimal    Findings:  Diagnostic arthroscopy:  Patellofemoral chondromalacia grade 1 on the patella grade 2 with small areas of grade 3 on the trochlea, medial compartment small patch of grade III chondromalacia in the medial compartment to a 2 cm there was grade 2 on the tibial plateau and condyle with degenerative medial meniscus tear, notch ACL PCL intact, lateral compartment grade IV chondromalacia throughout degenerative meniscal tear, suprapatellar pouch significant number of loose bodies Enderby 13 in total all 2 cm or larger in length, x-ray at the end showed 1 further body remaining however when placing the scope this was up in the mid thigh within the musculature not loose actually so this was left    Complications: None    Indications for surgery:  61-year-old male who had right knee pain and mechanical symptoms he had an MRI and x-rays showing significant chondromalacia of the lateral compartment however this was not significantly painful he was more catching and mechanical symptoms from the suprapatellar compartment he had several loose  bodies on MRI we had a discussion as well as medial lateral meniscus tears we had discussion operative nonoperative treatment Keith to proceed with surgery wanted to address the meniscus tears as well as the loose bodies and was consented for right knee arthroscopic removal of loose bodies and medial lateral meniscectomies.  Risk-benefit alternatives were discussed the patient wished to proceed.    Procedure in detail:  Patient was met the preoperative area the right leg was marked. risk-benefit alternatives were discussed and consent was signed.  He was taken back to the operative suite was induced under general anesthesia he is placed supine on a regular bed.  The right leg was prepped and draped in usual sterile fashion timeout performed verifying surgical site surgical procedure perioperative antibiotics and plan staff.  We began by exsanguinating the leg inflating the tourniquet for a total of 35 minutes during the case.  We began by making our lateral portal from an outside imaging position medial portal under direct visualization with a spinal needle.  Diagnostic arthroscopy was performed with the findings listed above.  Medial lateral meniscectomies were performed using combination of biter and shaver until both meniscus tears were debrided back to stable and smooth positions completing our medial lateral meniscectomies.  We then went to the suprapatellar pouch there was an area of synovial fold we resected this and all the loose bodies were very easily visualized we placed our spinal needle to make a superior lateral portal made incision over this spot we sequentially removed all the loose bodies.  We took x-rays to confirm that none were left.  There was 1 that we could not find and was visualized on the x-ray as a result we placed our scope within it and found what was on the x-ray we did not visualize it was no longer loose it was Ronnie most and the midportion of his thigh is how high it was well  embedded within the musculature no longer considered loose that was not even intra-articular.  As a result we drained of the remaining fluid closed incisions with 2-0 Monocryl buried interrupted fashion for the deep dermal layer.  Sterile dressings were applied the tourniquet taken down the patient was awoken from anesthesia taken to the PACU without complications.

## 2022-11-23 NOTE — OR SURGEON
Immediate Post OP Note    PreOp Diagnosis:  1.  Multiple loose body suprapatellar patch  2.  Right knee medial meniscus tear  3.  Right knee lateral meniscus tear  4.  Significant lateral chondromalacia      PostOp Diagnosis: Same      Procedure(s):  1.  Arthroscopic medial lateral meniscectomy of the right knee  2.  Arthroscopic removal of loose bodies 13 of all greater than 2 cm    Surgeon(s):  Jordan Lilly M.D.    Anesthesiologist/Type of Anesthesia:  Anesthesiologist: Ángel Alfaro M.D./General    Surgical Staff:  Circulator: Beth Connors  Scrub Person: Tony Suresh    Specimens removed if any:  None    Estimated Blood Loss: Minimal    Findings:  Diagnostic arthroscopy:  Patellofemoral chondromalacia grade 1 on the patella grade 2 with small areas of grade 3 on the trochlea, medial compartment small patch of grade III chondromalacia in the medial compartment to a 2 cm there was grade 2 on the tibial plateau and condyle with degenerative medial meniscus tear, notch ACL PCL intact, lateral compartment grade IV chondromalacia throughout degenerative meniscal tear, suprapatellar pouch significant number of loose bodies Enderby 13 in total all 2 cm or larger in length, x-ray at the end showed 1 further body remaining however when placing the scope this was up in the mid thigh within the musculature not loose actually so this was left    Complications: None        11/23/2022 12:52 PM Jordan Lilly M.D.

## 2022-11-23 NOTE — OR NURSING
1400 right knee dressing became saturated w/ blood as pt got up from the gurney. Dressing reinforced, manual pressure applied at the site of bleeding, RLE elevated w/ pillows.    1405 Dr. Lilly notified; orders received to reinforce dressing. Pt laying comfortably on the gurney, RLE elevated. VSS, no c/o pain.      1430 Dr Lilly at bedside and placed new dressing at the operative site. No further bleeding observed.       1520 Discharged to care of family after uneventful stay at PACU 2

## 2022-11-23 NOTE — ANESTHESIA PREPROCEDURE EVALUATION
Case: [id number] Date/Time: 11/23/22 1045    Procedures:       ARTHROSCOPIC VERSUS OPEN REMOVAL OF LOOSE BODIES RIGHT KNEE, ARTHROSCOPIC MEDIAL AND LATERAL MENISCECTOMY      MENISCECTOMY, KNEE, ARTHROSCOPIC    Pre-op diagnosis: LOOSE BODY IN RIGHT KNEE JOINT    Location: [facility] OR 02 / SURGERY [facility]    Surgeons: Jordan Lilly M.D.      Smoker- 30 years  History gastric bypass  No cardiac symptoms  Relevant Problems   No relevant active problems       Physical Exam    Airway   Mallampati: II  TM distance: >3 FB  Neck ROM: full       Cardiovascular - normal exam  Rhythm: regular  Rate: normal  (-) murmur     Dental - normal exam  (+) upper dentures, lower dentures           Pulmonary - normal exam  Breath sounds clear to auscultation     Abdominal    Neurological - normal exam                 Anesthesia Plan    ASA 2       Plan - general       Airway plan will be LMA          Induction: intravenous    Postoperative Plan: Postoperative administration of opioids is intended.    Pertinent diagnostic labs and testing reviewed    Informed Consent:    Anesthetic plan and risks discussed with patient.    Use of blood products discussed with: patient whom consented to blood products.

## 2022-11-23 NOTE — OR NURSING
1245 Pt to PACU from OR on mil s/p Arthroscopic removal of 13 loose bodies right knee with a medial and lateral meniscectomy. Report received. Respirations even and unlabored. VSS. Strong pulses equal bilaterally and CMS intact. Surgical dressing CDI. Ice applied    1300 patient tolerated water.  Pain medication given for 8/10 pain.  Oriented to post anesthesia care unit.  No changes to surgical site.     1315 pain better controlled. No changes to surgical incision.  No complains of nausea.    1330 Medication hold met.  Meets criteria to transfer to stage 2.     1345 report given to Isidra.

## 2022-11-23 NOTE — ANESTHESIA TIME REPORT
Anesthesia Start and Stop Event Times     Date Time Event    11/23/2022 1126 Ready for Procedure     1134 Anesthesia Start     1247 Anesthesia Stop        Responsible Staff  11/23/22    Name Role Begin End    Ángel lAfaro M.D. Anesth 1134 1247        Overtime Reason:  no overtime (within assigned shift)    Comments:

## 2022-11-23 NOTE — ANESTHESIA PROCEDURE NOTES
Airway    Date/Time: 11/23/2022 11:40 AM  Performed by: Ángel Alfaro M.D.  Authorized by: Ángel Alfaro M.D.     Location:  OR  Urgency:  Elective  Indications for Airway Management:  Anesthesia      Spontaneous Ventilation: absent    Sedation Level:  Deep  Preoxygenated: Yes    Final Airway Type:  Supraglottic airway  Final Supraglottic Airway:  Standard LMA    SGA Size:  4  Number of Attempts at Approach:  1

## 2022-11-24 NOTE — ANESTHESIA POSTPROCEDURE EVALUATION
Patient: Kris Lewis    Procedure Summary     Date: 11/23/22 Room / Location:  OR  / SURGERY [facility]    Anesthesia Start: 1134 Anesthesia Stop: 1247    Procedures:       ARTHROSCOPIC VERSUS OPEN REMOVAL OF LOOSE BODIES RIGHT KNEE, ARTHROSCOPIC MEDIAL AND LATERAL MENISCECTOMY (Right: Knee)      MENISCECTOMY, KNEE, ARTHROSCOPIC (Right: Knee) Diagnosis: (LOOSE BODY IN RIGHT KNEE JOINT)    Surgeons: Jordan Lilly M.D. Responsible Provider: Ángel Alfaro M.D.    Anesthesia Type: general ASA Status: 2          Final Anesthesia Type: general  Last vitals  BP   Blood Pressure: (!) 141/82    Temp   36.4 °C (97.5 °F)    Pulse   62   Resp   16    SpO2   92 %      Anesthesia Post Evaluation    Patient location during evaluation: PACU  Patient participation: complete - patient participated  Level of consciousness: awake and alert    Airway patency: patent  Anesthetic complications: no  Cardiovascular status: hemodynamically stable  Respiratory status: acceptable  Hydration status: euvolemic    PONV: none          No notable events documented.     Nurse Pain Score: 2 (NPRS)

## 2022-11-25 ENCOUNTER — HOSPITAL ENCOUNTER (EMERGENCY)
Facility: MEDICAL CENTER | Age: 62
End: 2022-11-25
Attending: EMERGENCY MEDICINE
Payer: COMMERCIAL

## 2022-11-25 VITALS
HEART RATE: 80 BPM | SYSTOLIC BLOOD PRESSURE: 138 MMHG | RESPIRATION RATE: 18 BRPM | BODY MASS INDEX: 25.86 KG/M2 | TEMPERATURE: 97.8 F | OXYGEN SATURATION: 96 % | WEIGHT: 190.92 LBS | DIASTOLIC BLOOD PRESSURE: 75 MMHG | HEIGHT: 72 IN

## 2022-11-25 DIAGNOSIS — L76.22 POSTOPERATIVE HEMORRHAGE OF SUBCUTANEOUS TISSUE FOLLOWING NON-DERMATOLOGIC PROCEDURE: ICD-10-CM

## 2022-11-25 PROCEDURE — 304999 HCHG REPAIR-SIMPLE/INTERMED LEVEL 1

## 2022-11-25 PROCEDURE — 99283 EMERGENCY DEPT VISIT LOW MDM: CPT

## 2022-11-25 PROCEDURE — 304217 HCHG IRRIGATION SYSTEM

## 2022-11-25 PROCEDURE — 303747 HCHG EXTRA SUTURE

## 2022-11-25 PROCEDURE — 700101 HCHG RX REV CODE 250: Performed by: EMERGENCY MEDICINE

## 2022-11-25 RX ADMIN — LIDOCAINE HYDROCHLORIDE 10 ML: 10; .005 INJECTION, SOLUTION EPIDURAL; INFILTRATION; INTRACAUDAL; PERINEURAL at 14:00

## 2022-11-25 ASSESSMENT — FIBROSIS 4 INDEX: FIB4 SCORE: 1.11

## 2022-11-25 NOTE — ED PROVIDER NOTES
ED Provider Note    Scribed for Gigi Mon M.D. by Jazmine Dominguez. 2022,  1:11 PM.    CHIEF COMPLAINT  Chief Complaint   Patient presents with    Post-Op Complications       HPI  Kris Lewis is a 61 y.o. male who presents to the Emergency Department for post surgical bleeding. Patient states he had orthoscopic surgery of his right knee with debris removal 2 days ago and has had continuous bleeding and oozing from surgical site. He states he called into his surgeons office this morning and told them he was coming in for evaluation but showed up at he wrong office in [facility]. Patient states area is tender but has no other complaints today.    REVIEW OF SYSTEMS  See HPI for further details. All other systems are negative.     PAST MEDICAL HISTORY   has a past medical history of Arthritis, Dental disorder, and Right knee pain (2022).    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    CURRENT MEDICATIONS  Current Outpatient Medications   Medication Instructions    ibuprofen (MOTRIN) 400 mg, Oral, EVERY 6 HOURS PRN     ALLERGIES  No Known Allergies    PHYSICAL EXAM  VITAL SIGNS: BP (!) 150/79   Pulse 82   Temp 36.3 °C (97.3 °F) (Temporal)   Resp 18   Ht 1.829 m (6')   Wt 86.6 kg (190 lb 14.7 oz)   SpO2 97%    BMI 25.89 kg/m²   Pulse ox interpretation: I interpret this pulse ox as normal.  Constitutional: Alert in no apparent distress.  HENT: Normocephalic, Atraumatic, Bilateral external ears normal. Nose normal.   Eyes: Conjunctiva normal, non-icteric.   Heart: Regular rate and rythm, no murmurs.    Lungs: Clear to auscultation bilaterally.  Skin: Warm, Dry, No erythema, No rash.   Neurologic: Alert, Grossly non-focal.   Psychiatric: Affect normal, Judgment normal, Mood normal, Appears appropriate and not intoxicated.     PROCEDURES  Laceration Repair Procedure Note    Indication: Improperly closed surgical site    Procedure: The patient was placed in the appropriate position and anesthesia around the laceration was obtained by infiltration using 1% Lidocaine with epinephrine. The area was then irrigated with normal saline. The laceration was closed with Prolene using horizontal mattress sutures. There were no additional lacerations requiring repair. The wound area was then dressed with a bandage.      Total repaired wound length: 0.5 cm.     Other Items: Suture count: 1    The patient tolerated the procedure well.    Complications: None    COURSE & MEDICAL DECISION MAKING  Nursing notes, VS, PMSFHx reviewed in chart.     1:11 PM Patient seen and examined at bedside. I informed the patient I will reach out to his surgeon for consult but most likely will put a stitch in the surgical site to stop bleeding. I will update patient on the plan after consulting with his surgeon. Paged Orthopedics    1:37 PM I discussed the patient's case and the above findings with Dr. Lilly'partner (Orthopedics) who agrees with my plan to add a stitch to the patients incision site and follow up with him in clinic next week.     2:06 PM - I performed a laceration repair as detailed above. I informed the patient he should have the stitches removed in 7-10 days. I discussed plan for discharge and follow up as outlined below. The patient is  stable for discharge at this time and will return for any new or worsening symptoms. Patient verbalizes understanding and support with my plan for discharge.      The patient will return for new or worsening symptoms and is stable at the time of discharge.    DISPOSITION:  Patient will be discharged home in stable condition.    FOLLOW UP:  Your orthopedic surgeon    Schedule an appointment as soon as possible for a visit       FINAL IMPRESSION  1. Postoperative hemorrhage of subcutaneous tissue following non-dermatologic procedure         Jazmine BROWN (Alena), am scribing for, and in the presence of, Gigi Mon M.D..    Electronically signed by: Jazmine Dominguez (Alena), 11/25/2022    Gigi BROWN M.D. personally performed the services described in this documentation, as scribed by Jazmine Dominguez in my presence, and it is both accurate and complete.    The note accurately reflects work and decisions made by me.  Gigi Mon M.D.  11/25/2022  3:20 PM

## 2022-11-25 NOTE — ED NOTES
Pt back to dior bed with female guest  Pt reports he had a procedure done on Wednesday and continued bleeding since, dressing in place and saturated with bright red blood

## 2022-11-25 NOTE — ED TRIAGE NOTES
Pt amb to triage POD#2 R knee c/o post-op complications incl R knee incision site bleeding. Pt has intact drsg pta.

## 2022-12-28 ENCOUNTER — ANESTHESIA EVENT (OUTPATIENT)
Dept: SURGERY | Facility: MEDICAL CENTER | Age: 62
DRG: 326 | End: 2022-12-28
Payer: COMMERCIAL

## 2022-12-28 ENCOUNTER — ANESTHESIA (OUTPATIENT)
Dept: SURGERY | Facility: MEDICAL CENTER | Age: 62
DRG: 326 | End: 2022-12-28
Payer: COMMERCIAL

## 2022-12-28 ENCOUNTER — HOSPITAL ENCOUNTER (INPATIENT)
Facility: MEDICAL CENTER | Age: 62
LOS: 6 days | DRG: 326 | End: 2023-01-03
Attending: EMERGENCY MEDICINE | Admitting: SURGERY
Payer: COMMERCIAL

## 2022-12-28 ENCOUNTER — APPOINTMENT (OUTPATIENT)
Dept: RADIOLOGY | Facility: MEDICAL CENTER | Age: 62
DRG: 326 | End: 2022-12-28
Attending: EMERGENCY MEDICINE
Payer: COMMERCIAL

## 2022-12-28 DIAGNOSIS — K28.5 CHRONIC MARGINAL ULCER WITH PERFORATION (HCC): ICD-10-CM

## 2022-12-28 LAB
ALBUMIN SERPL BCP-MCNC: 4 G/DL (ref 3.2–4.9)
ALBUMIN/GLOB SERPL: 1.5 G/DL
ALP SERPL-CCNC: 83 U/L (ref 30–99)
ALT SERPL-CCNC: 10 U/L (ref 2–50)
ANION GAP SERPL CALC-SCNC: 10 MMOL/L (ref 7–16)
AST SERPL-CCNC: 13 U/L (ref 12–45)
BASOPHILS # BLD AUTO: 1.1 % (ref 0–1.8)
BASOPHILS # BLD: 0.09 K/UL (ref 0–0.12)
BILIRUB SERPL-MCNC: 0.4 MG/DL (ref 0.1–1.5)
BUN SERPL-MCNC: 16 MG/DL (ref 8–22)
CALCIUM ALBUM COR SERPL-MCNC: 8.8 MG/DL (ref 8.5–10.5)
CALCIUM SERPL-MCNC: 8.8 MG/DL (ref 8.5–10.5)
CHLORIDE SERPL-SCNC: 109 MMOL/L (ref 96–112)
CO2 SERPL-SCNC: 23 MMOL/L (ref 20–33)
CREAT SERPL-MCNC: 0.7 MG/DL (ref 0.5–1.4)
CRP SERPL HS-MCNC: <0.3 MG/DL (ref 0–0.75)
D DIMER PPP IA.FEU-MCNC: 0.88 UG/ML (FEU) (ref 0–0.5)
EKG IMPRESSION: NORMAL
EOSINOPHIL # BLD AUTO: 0.38 K/UL (ref 0–0.51)
EOSINOPHIL NFR BLD: 4.5 % (ref 0–6.9)
ERYTHROCYTE [DISTWIDTH] IN BLOOD BY AUTOMATED COUNT: 47.3 FL (ref 35.9–50)
ERYTHROCYTE [SEDIMENTATION RATE] IN BLOOD BY WESTERGREN METHOD: 8 MM/HOUR (ref 0–20)
FLUAV RNA SPEC QL NAA+PROBE: NEGATIVE
FLUBV RNA SPEC QL NAA+PROBE: NEGATIVE
GFR SERPLBLD CREATININE-BSD FMLA CKD-EPI: 105 ML/MIN/1.73 M 2
GLOBULIN SER CALC-MCNC: 2.7 G/DL (ref 1.9–3.5)
GLUCOSE SERPL-MCNC: 119 MG/DL (ref 65–99)
HCT VFR BLD AUTO: 41.6 % (ref 42–52)
HGB BLD-MCNC: 13.3 G/DL (ref 14–18)
IMM GRANULOCYTES # BLD AUTO: 0.03 K/UL (ref 0–0.11)
IMM GRANULOCYTES NFR BLD AUTO: 0.4 % (ref 0–0.9)
LACTATE SERPL-SCNC: 1 MMOL/L (ref 0.5–2)
LIPASE SERPL-CCNC: 37 U/L (ref 11–82)
LYMPHOCYTES # BLD AUTO: 2.64 K/UL (ref 1–4.8)
LYMPHOCYTES NFR BLD: 31.5 % (ref 22–41)
MCH RBC QN AUTO: 27.3 PG (ref 27–33)
MCHC RBC AUTO-ENTMCNC: 32 G/DL (ref 33.7–35.3)
MCV RBC AUTO: 85.4 FL (ref 81.4–97.8)
MONOCYTES # BLD AUTO: 0.91 K/UL (ref 0–0.85)
MONOCYTES NFR BLD AUTO: 10.9 % (ref 0–13.4)
NEUTROPHILS # BLD AUTO: 4.33 K/UL (ref 1.82–7.42)
NEUTROPHILS NFR BLD: 51.6 % (ref 44–72)
NRBC # BLD AUTO: 0 K/UL
NRBC BLD-RTO: 0 /100 WBC
PLATELET # BLD AUTO: 278 K/UL (ref 164–446)
PMV BLD AUTO: 9 FL (ref 9–12.9)
POTASSIUM SERPL-SCNC: 4.3 MMOL/L (ref 3.6–5.5)
PROCALCITONIN SERPL-MCNC: 0.07 NG/ML
PROT SERPL-MCNC: 6.7 G/DL (ref 6–8.2)
RBC # BLD AUTO: 4.87 M/UL (ref 4.7–6.1)
RSV RNA SPEC QL NAA+PROBE: NEGATIVE
SARS-COV-2 RNA RESP QL NAA+PROBE: NOTDETECTED
SODIUM SERPL-SCNC: 142 MMOL/L (ref 135–145)
SPECIMEN SOURCE: NORMAL
TROPONIN T SERPL-MCNC: 12 NG/L (ref 6–19)
WBC # BLD AUTO: 8.4 K/UL (ref 4.8–10.8)

## 2022-12-28 PROCEDURE — C9803 HOPD COVID-19 SPEC COLLECT: HCPCS | Performed by: EMERGENCY MEDICINE

## 2022-12-28 PROCEDURE — 85652 RBC SED RATE AUTOMATED: CPT

## 2022-12-28 PROCEDURE — 0DU647Z SUPPLEMENT STOMACH WITH AUTOLOGOUS TISSUE SUBSTITUTE, PERCUTANEOUS ENDOSCOPIC APPROACH: ICD-10-PCS | Performed by: SURGERY

## 2022-12-28 PROCEDURE — 160028 HCHG SURGERY MINUTES - 1ST 30 MINS LEVEL 3: Performed by: SURGERY

## 2022-12-28 PROCEDURE — 99291 CRITICAL CARE FIRST HOUR: CPT

## 2022-12-28 PROCEDURE — 700111 HCHG RX REV CODE 636 W/ 250 OVERRIDE (IP): Performed by: SURGERY

## 2022-12-28 PROCEDURE — 83605 ASSAY OF LACTIC ACID: CPT

## 2022-12-28 PROCEDURE — 85379 FIBRIN DEGRADATION QUANT: CPT

## 2022-12-28 PROCEDURE — 160039 HCHG SURGERY MINUTES - EA ADDL 1 MIN LEVEL 3: Performed by: SURGERY

## 2022-12-28 PROCEDURE — 160042 HCHG SURGERY MINUTES - EA ADDL 1 MIN LEVEL 5: Performed by: SURGERY

## 2022-12-28 PROCEDURE — 84145 PROCALCITONIN (PCT): CPT

## 2022-12-28 PROCEDURE — 85025 COMPLETE CBC W/AUTO DIFF WBC: CPT

## 2022-12-28 PROCEDURE — 160002 HCHG RECOVERY MINUTES (STAT): Performed by: SURGERY

## 2022-12-28 PROCEDURE — 99254 IP/OBS CNSLTJ NEW/EST MOD 60: CPT | Mod: 57 | Performed by: SURGERY

## 2022-12-28 PROCEDURE — 0241U HCHG SARS-COV-2 COVID-19 NFCT DS RESP RNA 4 TRGT MIC: CPT

## 2022-12-28 PROCEDURE — 71275 CT ANGIOGRAPHY CHEST: CPT

## 2022-12-28 PROCEDURE — 160036 HCHG PACU - EA ADDL 30 MINS PHASE I: Performed by: SURGERY

## 2022-12-28 PROCEDURE — 43840 GSTRRPHY SUTR DUOL/GSTR ULCR: CPT | Performed by: SURGERY

## 2022-12-28 PROCEDURE — 160009 HCHG ANES TIME/MIN: Performed by: SURGERY

## 2022-12-28 PROCEDURE — 160048 HCHG OR STATISTICAL LEVEL 1-5: Performed by: SURGERY

## 2022-12-28 PROCEDURE — 71045 X-RAY EXAM CHEST 1 VIEW: CPT

## 2022-12-28 PROCEDURE — 770001 HCHG ROOM/CARE - MED/SURG/GYN PRIV*

## 2022-12-28 PROCEDURE — 93005 ELECTROCARDIOGRAM TRACING: CPT

## 2022-12-28 PROCEDURE — 74177 CT ABD & PELVIS W/CONTRAST: CPT

## 2022-12-28 PROCEDURE — 700111 HCHG RX REV CODE 636 W/ 250 OVERRIDE (IP): Performed by: ANESTHESIOLOGY

## 2022-12-28 PROCEDURE — 700105 HCHG RX REV CODE 258: Performed by: ANESTHESIOLOGY

## 2022-12-28 PROCEDURE — 110371 HCHG SHELL REV 272: Performed by: SURGERY

## 2022-12-28 PROCEDURE — 36415 COLL VENOUS BLD VENIPUNCTURE: CPT

## 2022-12-28 PROCEDURE — 86140 C-REACTIVE PROTEIN: CPT

## 2022-12-28 PROCEDURE — 700105 HCHG RX REV CODE 258: Performed by: SURGERY

## 2022-12-28 PROCEDURE — 96374 THER/PROPH/DIAG INJ IV PUSH: CPT

## 2022-12-28 PROCEDURE — 160035 HCHG PACU - 1ST 60 MINS PHASE I: Performed by: SURGERY

## 2022-12-28 PROCEDURE — 80053 COMPREHEN METABOLIC PANEL: CPT

## 2022-12-28 PROCEDURE — 84484 ASSAY OF TROPONIN QUANT: CPT

## 2022-12-28 PROCEDURE — 700117 HCHG RX CONTRAST REV CODE 255: Performed by: EMERGENCY MEDICINE

## 2022-12-28 PROCEDURE — 99140 ANES COMP EMERGENCY COND: CPT | Performed by: ANESTHESIOLOGY

## 2022-12-28 PROCEDURE — C9113 INJ PANTOPRAZOLE SODIUM, VIA: HCPCS | Performed by: SURGERY

## 2022-12-28 PROCEDURE — 700101 HCHG RX REV CODE 250: Performed by: ANESTHESIOLOGY

## 2022-12-28 PROCEDURE — 00790 ANES IPER UPR ABD NOS: CPT | Performed by: ANESTHESIOLOGY

## 2022-12-28 PROCEDURE — 83690 ASSAY OF LIPASE: CPT

## 2022-12-28 PROCEDURE — 160031 HCHG SURGERY MINUTES - 1ST 30 MINS LEVEL 5: Performed by: SURGERY

## 2022-12-28 PROCEDURE — 87040 BLOOD CULTURE FOR BACTERIA: CPT

## 2022-12-28 PROCEDURE — 700101 HCHG RX REV CODE 250: Performed by: SURGERY

## 2022-12-28 PROCEDURE — 700111 HCHG RX REV CODE 636 W/ 250 OVERRIDE (IP): Performed by: EMERGENCY MEDICINE

## 2022-12-28 PROCEDURE — 93005 ELECTROCARDIOGRAM TRACING: CPT | Performed by: EMERGENCY MEDICINE

## 2022-12-28 RX ORDER — ONDANSETRON 2 MG/ML
4 INJECTION INTRAMUSCULAR; INTRAVENOUS
Status: DISCONTINUED | OUTPATIENT
Start: 2022-12-28 | End: 2022-12-28 | Stop reason: HOSPADM

## 2022-12-28 RX ORDER — ONDANSETRON 2 MG/ML
4 INJECTION INTRAMUSCULAR; INTRAVENOUS EVERY 4 HOURS PRN
Status: DISCONTINUED | OUTPATIENT
Start: 2022-12-28 | End: 2023-01-02

## 2022-12-28 RX ORDER — OXYCODONE HYDROCHLORIDE 5 MG/1
5 TABLET ORAL
Status: DISCONTINUED | OUTPATIENT
Start: 2022-12-28 | End: 2022-12-29

## 2022-12-28 RX ORDER — HYDROMORPHONE HYDROCHLORIDE 1 MG/ML
0.1 INJECTION, SOLUTION INTRAMUSCULAR; INTRAVENOUS; SUBCUTANEOUS
Status: DISCONTINUED | OUTPATIENT
Start: 2022-12-28 | End: 2022-12-28 | Stop reason: HOSPADM

## 2022-12-28 RX ORDER — POLYETHYLENE GLYCOL 3350 17 G/17G
1 POWDER, FOR SOLUTION ORAL 2 TIMES DAILY
Status: DISCONTINUED | OUTPATIENT
Start: 2022-12-28 | End: 2022-12-29

## 2022-12-28 RX ORDER — HYDROMORPHONE HYDROCHLORIDE 1 MG/ML
0.4 INJECTION, SOLUTION INTRAMUSCULAR; INTRAVENOUS; SUBCUTANEOUS
Status: DISCONTINUED | OUTPATIENT
Start: 2022-12-28 | End: 2022-12-28 | Stop reason: HOSPADM

## 2022-12-28 RX ORDER — BISACODYL 10 MG
10 SUPPOSITORY, RECTAL RECTAL
Status: DISCONTINUED | OUTPATIENT
Start: 2022-12-28 | End: 2023-01-03 | Stop reason: HOSPADM

## 2022-12-28 RX ORDER — ROCURONIUM BROMIDE 10 MG/ML
INJECTION, SOLUTION INTRAVENOUS PRN
Status: DISCONTINUED | OUTPATIENT
Start: 2022-12-28 | End: 2022-12-28 | Stop reason: SURG

## 2022-12-28 RX ORDER — HALOPERIDOL 5 MG/ML
1 INJECTION INTRAMUSCULAR
Status: DISCONTINUED | OUTPATIENT
Start: 2022-12-28 | End: 2022-12-28 | Stop reason: HOSPADM

## 2022-12-28 RX ORDER — HYDRALAZINE HYDROCHLORIDE 20 MG/ML
5 INJECTION INTRAMUSCULAR; INTRAVENOUS
Status: DISCONTINUED | OUTPATIENT
Start: 2022-12-28 | End: 2022-12-28 | Stop reason: HOSPADM

## 2022-12-28 RX ORDER — SODIUM CHLORIDE, SODIUM LACTATE, POTASSIUM CHLORIDE, CALCIUM CHLORIDE 600; 310; 30; 20 MG/100ML; MG/100ML; MG/100ML; MG/100ML
INJECTION, SOLUTION INTRAVENOUS
Status: DISCONTINUED | OUTPATIENT
Start: 2022-12-28 | End: 2022-12-28 | Stop reason: SURG

## 2022-12-28 RX ORDER — MAGNESIUM HYDROXIDE 1200 MG/15ML
LIQUID ORAL
Status: COMPLETED | OUTPATIENT
Start: 2022-12-28 | End: 2022-12-28

## 2022-12-28 RX ORDER — PANTOPRAZOLE SODIUM 40 MG/10ML
40 INJECTION, POWDER, LYOPHILIZED, FOR SOLUTION INTRAVENOUS 2 TIMES DAILY
Status: DISCONTINUED | OUTPATIENT
Start: 2022-12-28 | End: 2023-01-02

## 2022-12-28 RX ORDER — ONDANSETRON 2 MG/ML
INJECTION INTRAMUSCULAR; INTRAVENOUS PRN
Status: DISCONTINUED | OUTPATIENT
Start: 2022-12-28 | End: 2022-12-28 | Stop reason: SURG

## 2022-12-28 RX ORDER — DIPHENHYDRAMINE HYDROCHLORIDE 50 MG/ML
6.25 INJECTION INTRAMUSCULAR; INTRAVENOUS
Status: DISCONTINUED | OUTPATIENT
Start: 2022-12-28 | End: 2022-12-28 | Stop reason: HOSPADM

## 2022-12-28 RX ORDER — ONDANSETRON 4 MG/1
4 TABLET, ORALLY DISINTEGRATING ORAL EVERY 4 HOURS PRN
Status: DISCONTINUED | OUTPATIENT
Start: 2022-12-28 | End: 2022-12-29

## 2022-12-28 RX ORDER — SODIUM CHLORIDE 9 MG/ML
INJECTION, SOLUTION INTRAVENOUS CONTINUOUS
Status: DISCONTINUED | OUTPATIENT
Start: 2022-12-28 | End: 2023-01-02

## 2022-12-28 RX ORDER — MEPERIDINE HYDROCHLORIDE 25 MG/ML
12.5 INJECTION INTRAMUSCULAR; INTRAVENOUS; SUBCUTANEOUS
Status: DISCONTINUED | OUTPATIENT
Start: 2022-12-28 | End: 2022-12-28 | Stop reason: HOSPADM

## 2022-12-28 RX ORDER — DEXAMETHASONE SODIUM PHOSPHATE 4 MG/ML
INJECTION, SOLUTION INTRA-ARTICULAR; INTRALESIONAL; INTRAMUSCULAR; INTRAVENOUS; SOFT TISSUE PRN
Status: DISCONTINUED | OUTPATIENT
Start: 2022-12-28 | End: 2022-12-28 | Stop reason: SURG

## 2022-12-28 RX ORDER — ALBUTEROL SULFATE 2.5 MG/3ML
2.5 SOLUTION RESPIRATORY (INHALATION)
Status: DISCONTINUED | OUTPATIENT
Start: 2022-12-28 | End: 2022-12-28 | Stop reason: HOSPADM

## 2022-12-28 RX ORDER — HYDROMORPHONE HYDROCHLORIDE 1 MG/ML
0.5 INJECTION, SOLUTION INTRAMUSCULAR; INTRAVENOUS; SUBCUTANEOUS
Status: DISCONTINUED | OUTPATIENT
Start: 2022-12-28 | End: 2022-12-29

## 2022-12-28 RX ORDER — MORPHINE SULFATE 4 MG/ML
INJECTION INTRAVENOUS
Status: DISCONTINUED
Start: 2022-12-28 | End: 2022-12-28

## 2022-12-28 RX ORDER — ENOXAPARIN SODIUM 100 MG/ML
40 INJECTION SUBCUTANEOUS DAILY
Status: DISCONTINUED | OUTPATIENT
Start: 2022-12-29 | End: 2023-01-03 | Stop reason: HOSPADM

## 2022-12-28 RX ORDER — BUPIVACAINE HYDROCHLORIDE AND EPINEPHRINE 5; 5 MG/ML; UG/ML
INJECTION, SOLUTION EPIDURAL; INTRACAUDAL; PERINEURAL
Status: DISCONTINUED | OUTPATIENT
Start: 2022-12-28 | End: 2022-12-28 | Stop reason: HOSPADM

## 2022-12-28 RX ORDER — DOCUSATE SODIUM 100 MG/1
100 CAPSULE, LIQUID FILLED ORAL 2 TIMES DAILY
Status: DISCONTINUED | OUTPATIENT
Start: 2022-12-28 | End: 2022-12-29

## 2022-12-28 RX ORDER — LIDOCAINE HYDROCHLORIDE 10 MG/ML
INJECTION, SOLUTION EPIDURAL; INFILTRATION; INTRACAUDAL; PERINEURAL PRN
Status: DISCONTINUED | OUTPATIENT
Start: 2022-12-28 | End: 2022-12-28 | Stop reason: SURG

## 2022-12-28 RX ORDER — HYDROMORPHONE HYDROCHLORIDE 2 MG/ML
INJECTION, SOLUTION INTRAMUSCULAR; INTRAVENOUS; SUBCUTANEOUS PRN
Status: DISCONTINUED | OUTPATIENT
Start: 2022-12-28 | End: 2022-12-28 | Stop reason: SURG

## 2022-12-28 RX ORDER — MORPHINE SULFATE 4 MG/ML
4 INJECTION INTRAVENOUS ONCE
Status: COMPLETED | OUTPATIENT
Start: 2022-12-28 | End: 2022-12-28

## 2022-12-28 RX ORDER — AMOXICILLIN 250 MG
1 CAPSULE ORAL NIGHTLY
Status: DISCONTINUED | OUTPATIENT
Start: 2022-12-28 | End: 2022-12-29

## 2022-12-28 RX ORDER — ENEMA 19; 7 G/133ML; G/133ML
1 ENEMA RECTAL
Status: DISCONTINUED | OUTPATIENT
Start: 2022-12-28 | End: 2023-01-03 | Stop reason: HOSPADM

## 2022-12-28 RX ORDER — SODIUM CHLORIDE, SODIUM LACTATE, POTASSIUM CHLORIDE, CALCIUM CHLORIDE 600; 310; 30; 20 MG/100ML; MG/100ML; MG/100ML; MG/100ML
INJECTION, SOLUTION INTRAVENOUS
Status: COMPLETED | OUTPATIENT
Start: 2022-12-28 | End: 2022-12-28

## 2022-12-28 RX ORDER — OXYCODONE HYDROCHLORIDE 5 MG/1
10 TABLET ORAL
Status: DISCONTINUED | OUTPATIENT
Start: 2022-12-28 | End: 2022-12-29

## 2022-12-28 RX ORDER — CEFOTETAN DISODIUM 2 G/20ML
INJECTION, POWDER, FOR SOLUTION INTRAMUSCULAR; INTRAVENOUS PRN
Status: DISCONTINUED | OUTPATIENT
Start: 2022-12-28 | End: 2022-12-28 | Stop reason: SURG

## 2022-12-28 RX ORDER — HYDROMORPHONE HYDROCHLORIDE 1 MG/ML
0.2 INJECTION, SOLUTION INTRAMUSCULAR; INTRAVENOUS; SUBCUTANEOUS
Status: DISCONTINUED | OUTPATIENT
Start: 2022-12-28 | End: 2022-12-28 | Stop reason: HOSPADM

## 2022-12-28 RX ORDER — SODIUM CHLORIDE, SODIUM LACTATE, POTASSIUM CHLORIDE, CALCIUM CHLORIDE 600; 310; 30; 20 MG/100ML; MG/100ML; MG/100ML; MG/100ML
INJECTION, SOLUTION INTRAVENOUS CONTINUOUS
Status: DISCONTINUED | OUTPATIENT
Start: 2022-12-28 | End: 2022-12-28 | Stop reason: HOSPADM

## 2022-12-28 RX ORDER — AMOXICILLIN 250 MG
1 CAPSULE ORAL
Status: DISCONTINUED | OUTPATIENT
Start: 2022-12-28 | End: 2022-12-29

## 2022-12-28 RX ADMIN — SODIUM CHLORIDE, POTASSIUM CHLORIDE, SODIUM LACTATE AND CALCIUM CHLORIDE: 600; 310; 30; 20 INJECTION, SOLUTION INTRAVENOUS at 10:45

## 2022-12-28 RX ADMIN — HYDROMORPHONE HYDROCHLORIDE 0.2 MG: 1 INJECTION, SOLUTION INTRAMUSCULAR; INTRAVENOUS; SUBCUTANEOUS at 12:00

## 2022-12-28 RX ADMIN — HYDROMORPHONE HYDROCHLORIDE 0.4 MG: 1 INJECTION, SOLUTION INTRAMUSCULAR; INTRAVENOUS; SUBCUTANEOUS at 11:25

## 2022-12-28 RX ADMIN — EPHEDRINE SULFATE 10 MG: 50 INJECTION, SOLUTION INTRAVENOUS at 09:58

## 2022-12-28 RX ADMIN — LIDOCAINE HYDROCHLORIDE 50 MG: 10 INJECTION, SOLUTION EPIDURAL; INFILTRATION; INTRACAUDAL; PERINEURAL at 09:48

## 2022-12-28 RX ADMIN — PIPERACILLIN AND TAZOBACTAM 4.5 G: 4; .5 INJECTION, POWDER, LYOPHILIZED, FOR SOLUTION INTRAVENOUS; PARENTERAL at 16:01

## 2022-12-28 RX ADMIN — MORPHINE SULFATE 4 MG: 4 INJECTION INTRAVENOUS at 07:41

## 2022-12-28 RX ADMIN — SODIUM CHLORIDE: 900 INJECTION INTRAVENOUS at 16:04

## 2022-12-28 RX ADMIN — MORPHINE SULFATE 4 MG: 4 INJECTION INTRAVENOUS at 07:25

## 2022-12-28 RX ADMIN — EPHEDRINE SULFATE 10 MG: 50 INJECTION, SOLUTION INTRAVENOUS at 10:04

## 2022-12-28 RX ADMIN — FENTANYL CITRATE 50 MCG: 50 INJECTION, SOLUTION INTRAMUSCULAR; INTRAVENOUS at 11:04

## 2022-12-28 RX ADMIN — FENTANYL CITRATE 50 MCG: 50 INJECTION, SOLUTION INTRAMUSCULAR; INTRAVENOUS at 11:08

## 2022-12-28 RX ADMIN — PIPERACILLIN AND TAZOBACTAM 4.5 G: 4; .5 INJECTION, POWDER, LYOPHILIZED, FOR SOLUTION INTRAVENOUS; PARENTERAL at 18:10

## 2022-12-28 RX ADMIN — SODIUM CHLORIDE, POTASSIUM CHLORIDE, SODIUM LACTATE AND CALCIUM CHLORIDE: 600; 310; 30; 20 INJECTION, SOLUTION INTRAVENOUS at 09:43

## 2022-12-28 RX ADMIN — PANTOPRAZOLE SODIUM 40 MG: 40 INJECTION, POWDER, FOR SOLUTION INTRAVENOUS at 18:07

## 2022-12-28 RX ADMIN — ROCURONIUM BROMIDE 60 MG: 10 INJECTION, SOLUTION INTRAVENOUS at 09:48

## 2022-12-28 RX ADMIN — ONDANSETRON 4 MG: 2 INJECTION INTRAMUSCULAR; INTRAVENOUS at 10:42

## 2022-12-28 RX ADMIN — CEFOTETAN DISODIUM 2 G: 2 INJECTION, POWDER, FOR SOLUTION INTRAMUSCULAR; INTRAVENOUS at 09:48

## 2022-12-28 RX ADMIN — HYDROMORPHONE HYDROCHLORIDE 0.5 MG: 1 INJECTION, SOLUTION INTRAMUSCULAR; INTRAVENOUS; SUBCUTANEOUS at 21:11

## 2022-12-28 RX ADMIN — IOHEXOL 100 ML: 350 INJECTION, SOLUTION INTRAVENOUS at 08:16

## 2022-12-28 RX ADMIN — DEXAMETHASONE SODIUM PHOSPHATE 8 MG: 4 INJECTION, SOLUTION INTRA-ARTICULAR; INTRALESIONAL; INTRAMUSCULAR; INTRAVENOUS; SOFT TISSUE at 09:55

## 2022-12-28 RX ADMIN — EPHEDRINE SULFATE 10 MG: 50 INJECTION, SOLUTION INTRAVENOUS at 10:10

## 2022-12-28 RX ADMIN — HYDROMORPHONE HYDROCHLORIDE 0.5 MG: 1 INJECTION, SOLUTION INTRAMUSCULAR; INTRAVENOUS; SUBCUTANEOUS at 18:06

## 2022-12-28 RX ADMIN — DIPHENHYDRAMINE HYDROCHLORIDE 6.25 MG: 50 INJECTION INTRAMUSCULAR; INTRAVENOUS at 11:43

## 2022-12-28 RX ADMIN — HYDROMORPHONE HYDROCHLORIDE 0.4 MG: 1 INJECTION, SOLUTION INTRAMUSCULAR; INTRAVENOUS; SUBCUTANEOUS at 11:12

## 2022-12-28 RX ADMIN — HYDROMORPHONE HYDROCHLORIDE 1 MG: 2 INJECTION INTRAMUSCULAR; INTRAVENOUS; SUBCUTANEOUS at 09:32

## 2022-12-28 RX ADMIN — SUGAMMADEX 200 MG: 100 INJECTION, SOLUTION INTRAVENOUS at 10:45

## 2022-12-28 RX ADMIN — PROPOFOL 150 MG: 10 INJECTION, EMULSION INTRAVENOUS at 09:48

## 2022-12-28 ASSESSMENT — LIFESTYLE VARIABLES
HOW MANY TIMES IN THE PAST YEAR HAVE YOU HAD 5 OR MORE DRINKS IN A DAY: 0
TOTAL SCORE: 0
TOTAL SCORE: 0
AVERAGE NUMBER OF DAYS PER WEEK YOU HAVE A DRINK CONTAINING ALCOHOL: 0
HAVE YOU EVER FELT YOU SHOULD CUT DOWN ON YOUR DRINKING: NO
ON A TYPICAL DAY WHEN YOU DRINK ALCOHOL HOW MANY DRINKS DO YOU HAVE: 0
EVER FELT BAD OR GUILTY ABOUT YOUR DRINKING: NO
CONSUMPTION TOTAL: NEGATIVE
HAVE PEOPLE ANNOYED YOU BY CRITICIZING YOUR DRINKING: NO
TOTAL SCORE: 0
ALCOHOL_USE: NO
EVER HAD A DRINK FIRST THING IN THE MORNING TO STEADY YOUR NERVES TO GET RID OF A HANGOVER: NO

## 2022-12-28 ASSESSMENT — COGNITIVE AND FUNCTIONAL STATUS - GENERAL
EATING MEALS: A LITTLE
DRESSING REGULAR UPPER BODY CLOTHING: A LITTLE
STANDING UP FROM CHAIR USING ARMS: A LITTLE
MOVING TO AND FROM BED TO CHAIR: A LITTLE
CLIMB 3 TO 5 STEPS WITH RAILING: A LITTLE
DAILY ACTIVITIY SCORE: 18
TURNING FROM BACK TO SIDE WHILE IN FLAT BAD: A LITTLE
HELP NEEDED FOR BATHING: A LITTLE
PERSONAL GROOMING: A LITTLE
WALKING IN HOSPITAL ROOM: A LITTLE
TOILETING: A LITTLE
DRESSING REGULAR LOWER BODY CLOTHING: A LITTLE
SUGGESTED CMS G CODE MODIFIER DAILY ACTIVITY: CK
MOVING FROM LYING ON BACK TO SITTING ON SIDE OF FLAT BED: A LITTLE
MOBILITY SCORE: 18
SUGGESTED CMS G CODE MODIFIER MOBILITY: CK

## 2022-12-28 ASSESSMENT — FIBROSIS 4 INDEX: FIB4 SCORE: 1.13

## 2022-12-28 ASSESSMENT — COPD QUESTIONNAIRES
DURING THE PAST 4 WEEKS HOW MUCH DID YOU FEEL SHORT OF BREATH: SOME OF THE TIME
DO YOU EVER COUGH UP ANY MUCUS OR PHLEGM?: NO/ONLY WITH OCCASIONAL COLDS OR INFECTIONS
HAVE YOU SMOKED AT LEAST 100 CIGARETTES IN YOUR ENTIRE LIFE: YES
COPD SCREENING SCORE: 5

## 2022-12-28 ASSESSMENT — PAIN DESCRIPTION - PAIN TYPE
TYPE: SURGICAL PAIN
TYPE: ACUTE PAIN;SURGICAL PAIN
TYPE: SURGICAL PAIN
TYPE: SURGICAL PAIN
TYPE: ACUTE PAIN;SURGICAL PAIN
TYPE: SURGICAL PAIN
TYPE: SURGICAL PAIN
TYPE: ACUTE PAIN
TYPE: SURGICAL PAIN

## 2022-12-28 ASSESSMENT — PATIENT HEALTH QUESTIONNAIRE - PHQ9
2. FEELING DOWN, DEPRESSED, IRRITABLE, OR HOPELESS: NOT AT ALL
SUM OF ALL RESPONSES TO PHQ9 QUESTIONS 1 AND 2: 0
1. LITTLE INTEREST OR PLEASURE IN DOING THINGS: NOT AT ALL

## 2022-12-28 NOTE — ANESTHESIA PROCEDURE NOTES
Airway    Date/Time: 12/28/2022 9:48 AM  Performed by: Osiel Harris M.D.  Authorized by: Osiel Harris M.D.     Location:  OR  Urgency:  Elective  Difficult Airway: No    Indications for Airway Management:  Anesthesia      Spontaneous Ventilation: absent    Sedation Level:  Deep  Preoxygenated: Yes    Patient Position:  Sniffing  Mask Difficulty Assessment:  0 - not attempted  Final Airway Type:  Endotracheal airway  Final Endotracheal Airway:  ETT  Cuffed: Yes    Technique Used for Successful ETT Placement:  Direct laryngoscopy  Devices/Methods Used in Placement:  Intubating stylet    Insertion Site:  Oral  Blade Type:  Shanel  Laryngoscope Blade/Videolaryngoscope Blade Size:  3  ETT Size (mm):  7.5  Measured from:  Teeth  ETT to Teeth (cm):  23  Placement Verified by: auscultation and capnometry    Cormack-Lehane Classification:  Grade IIa - partial view of glottis  Number of Attempts at Approach:  1

## 2022-12-28 NOTE — ED TRIAGE NOTES
Chief Complaint   Patient presents with    Abdominal Pain     Cramping upper abdominal pain x 1 day. -N/V    Shortness of Breath     X 1 day     Pt BIB EMS from home for above complaint. Pt received 100mcg fentanyl and 4 mg zofran prior to arrival. Protocol ordered. Chart up for ERP.

## 2022-12-28 NOTE — ANESTHESIA TIME REPORT
Anesthesia Start and Stop Event Times     Date Time Event    12/28/2022 0923 Ready for Procedure     0943 Anesthesia Start     1101 Anesthesia Stop        Responsible Staff  12/28/22    Name Role Begin End    Osiel Harris M.D. Anesth 0943 1101        Overtime Reason:  no overtime (within assigned shift)    Comments:

## 2022-12-28 NOTE — ED PROVIDER NOTES
ED Provider Note    CHIEF COMPLAINT  Chief Complaint   Patient presents with    Abdominal Pain     Cramping upper abdominal pain x 1 day. -N/V    Shortness of Breath     X 1 day       LIMITATION TO HISTORY   Select: : None    HPI  Kris Lewis is a 62 y.o. male who presents plaint of chest pain, abdominal pain.  He states he has had pain and cramping around his rib cage in the upper abdominal lower ribs for approximately 24 hours.  The pain is sharp in nature, there is no radiation to his back, to his groin, high up into his chest.  In addition he states he had increasing shortness of breath and work of breathing x1 day.  He does not endorse cough, fever, nausea, vomiting, diarrhea, alcohol use, hematochezia, melena, hematemesis.  He is vaccinated x1 for COVID several years ago    OUTSIDE HISTORIAN(S):  Select: Significant other wife is at bedside      REVIEW OF SYSTEMS  See HPI for further details. All other systems are negative.     PAST MEDICAL HISTORY   has a past medical history of Arthritis, Dental disorder, and Right knee pain (2022).    SURGICAL HISTORY   has a past surgical history that includes gastric bypass laparoscopic; knee arthroscopy (Left); knee scope,single menisectomy (Right, 2022); and loose body removal (Right, 2022).    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 (Registered Nurse) on 22 at 1015  Med  List Status: Not Addressed     Medication Last Dose Status   cefoTEtan (CEFOTAN) injection  Active   dexamethasone (DECADRON) injection  Active   ePHEDrine injection  Active   HYDROmorphone (DILAUDID) injection  Active   ibuprofen (MOTRIN) 200 MG Tab  Active   Lactated Ringers  Active   lidocaine PF (XYLOCAINE-MPF) 1 % injection  Active   propofol (DIPRIVAN) injection  Active   rocuronium (ZEMURON) injection  Active                    ALLERGIES  No Known Allergies    PHYSICAL EXAM  VITAL SIGNS: /61   Pulse 76   Temp 37.3 °C (99.1 °F) (Temporal)   Resp 12   Ht 1.829 m (6')   Wt 84.4 kg (186 lb)   SpO2 93%   BMI 25.23 kg/m²      Nursing notes and vitals reviewed.  Constitutional: Well developed, Well nourished, No acute distress, Non-toxic appearance.   Eyes: PERRLA, EOMI, Conjunctiva normal, No discharge.   HENT: Normocephalic, Atraumatic, moist mucous membranes, no facial edema Cardiovascular: Normal heart rate, Normal rhythm, No murmurs, No rubs, No gallops.   Thorax & Lungs: No respiratory distress, No rales, No rhonchi, No wheezing, No chest tenderness.   Abdomen: Diffuse abdominal \tenderness throughout focality, negative Macedo's sign, no pulsatile mass  Skin: Warm, Dry, No erythema, No rash.   Extremities: No deformity, no edema, good range of motion range of motion upper lower extremes bilaterally  Neurologic: Alert & oriented x 3, no focal abnormalities noted, acting appropriately on examination  Psychiatric: Affect normal for clinical presentation.   DIAGNOSTIC STUDIES / PROCEDURES  EKG  Results for orders placed or performed during the hospital encounter of 22   EKG   Result Value Ref Range    Report       [facility] Emergency Dept.    Test Date:  2022  Pt Name:    THI RICO                    Department: ER  MRN:        [id number]                      Room:        24  Gender:     Male                         Technician: [id number]  :        1960                    Requested By:ER TRIAGE PROTOCOL  Order #:    [id number]                    Reading MD: YOLANDA MORALES DO    Measurements  Intervals                                Axis  Rate:       70                           P:  AL:                                      QRS:        22  QRSD:       98                           T:          57  QT:         395  QTc:        427    Interpretive Statements  Atrial fibrillation  RSR' in V1 or V2, probably normal variant  No previous ECG available for comparison  Electronically Signed On 12- 6:44:30 PST by YOLANDA MORALES DO       LABS  Results for orders placed or performed during the hospital encounter of 12/28/22   Comp Metabolic Panel   Result Value Ref Range    Sodium 142 135 - 145 mmol/L    Potassium 4.3 3.6 - 5.5 mmol/L    Chloride 109 96 - 112 mmol/L    Co2 23 20 - 33 mmol/L    Anion Gap 10.0 7.0 - 16.0    Glucose 119 (H) 65 - 99 mg/dL    Bun 16 8 - 22 mg/dL    Creatinine 0.70 0.50 - 1.40 mg/dL    Calcium 8.8 8.5 - 10.5 mg/dL    AST(SGOT) 13 12 - 45 U/L    ALT(SGPT) 10 2 - 50 U/L    Alkaline Phosphatase 83 30 - 99 U/L    Total Bilirubin 0.4 0.1 - 1.5 mg/dL    Albumin 4.0 3.2 - 4.9 g/dL    Total Protein 6.7 6.0 - 8.2 g/dL    Globulin 2.7 1.9 - 3.5 g/dL    A-G Ratio 1.5 g/dL   CBC WITH DIFFERENTIAL   Result Value Ref Range    WBC 8.4 4.8 - 10.8 K/uL    RBC 4.87 4.70 - 6.10 M/uL    Hemoglobin 13.3 (L) 14.0 - 18.0 g/dL    Hematocrit 41.6 (L) 42.0 - 52.0 %    MCV 85.4 81.4 - 97.8 fL    MCH 27.3 27.0 - 33.0 pg    MCHC 32.0 (L) 33.7 - 35.3 g/dL    RDW 47.3 35.9 - 50.0 fL    Platelet Count 278 164 - 446 K/uL    MPV 9.0 9.0 - 12.9 fL    Neutrophils-Polys 51.60 44.00 - 72.00 %    Lymphocytes 31.50 22.00 - 41.00 %    Monocytes 10.90 0.00 - 13.40 %    Eosinophils 4.50 0.00 - 6.90 %    Basophils 1.10 0.00 - 1.80 %    Immature Granulocytes 0.40 0.00 - 0.90 %    Nucleated RBC 0.00 /100 WBC    Neutrophils (Absolute) 4.33 1.82 - 7.42 K/uL    Lymphs (Absolute) 2.64 1.00 -  4.80 K/uL    Monos (Absolute) 0.91 (H) 0.00 - 0.85 K/uL    Eos (Absolute) 0.38 0.00 - 0.51 K/uL    Baso (Absolute) 0.09 0.00 - 0.12 K/uL    Immature Granulocytes (abs) 0.03 0.00 - 0.11 K/uL    NRBC (Absolute) 0.00 K/uL   LIPASE   Result Value Ref Range    Lipase 37 11 - 82 U/L   Lactic Acid   Result Value Ref Range    Lactic Acid 1.0 0.5 - 2.0 mmol/L   Sed Rate   Result Value Ref Range    Sed Rate Westergren 8 0 - 20 mm/hour   C Reactive Protein Quantitative (Non-Cardiac)   Result Value Ref Range    Stat C-Reactive Protein <0.30 0.00 - 0.75 mg/dL   D-DIMER   Result Value Ref Range    D-Dimer Screen 0.88 (H) 0.00 - 0.50 ug/mL (FEU)   TROPONIN   Result Value Ref Range    Troponin T 12 6 - 19 ng/L   CoV-2, FLU A/B, and RSV by PCR (2-4 Hours CEPHEID) : Collect NP swab in VTM    Specimen: Respirate   Result Value Ref Range    Influenza virus A RNA Negative Negative    Influenza virus B, PCR Negative Negative    RSV, PCR Negative Negative    SARS-CoV-2 by PCR NotDetected     SARS-CoV-2 Source NP Swab    CORRECTED CALCIUM   Result Value Ref Range    Correct Calcium 8.8 8.5 - 10.5 mg/dL   ESTIMATED GFR   Result Value Ref Range    GFR (CKD-EPI) 105 >60 mL/min/1.73 m 2   PROCALCITONIN   Result Value Ref Range    Procalcitonin 0.07 <0.25 ng/mL   EKG   Result Value Ref Range    Report       [facility] Emergency Dept.    Test Date:  2022  Pt Name:    THI RICO                    Department: ER  MRN:        [id number]                      Room:        24  Gender:     Male                         Technician: [id number]  :        1960                   Requested By:ER TRIAGE PROTOCOL  Order #:    [id number]                    Semaj MD: YOLANDA MORALES, DO    Measurements  Intervals                                Axis  Rate:       70                           P:  MS:                                      QRS:        22  QRSD:       98                           T:          57  QT:         395  QTc:         427    Interpretive Statements  Atrial fibrillation  RSR' in V1 or V2, probably normal variant  No previous ECG available for comparison  Electronically Signed On 12- 6:44:30 PST by YOLANDA MORALES DO           RADIOLOGY  CT-ABDOMEN-PELVIS WITH   Final Result         1. Large amount of free intraperitoneal air, concerning for perforated viscus.      2. The exact perforation is not seen but there is wall thickening and stranding in the gastrojejunostomy area as well at the pylorus, possible areas of perforation.      CRITICAL RESULT READ BACK: Preliminary findings discussed with and critical read back performed by Dr. YOLANDA MORALES in the Emergency Department via telephone on 12/28/2022 8:28 AM            CT-CTA CHEST PULMONARY ARTERY W/ RECONS   Final Result         1.  No evidence of pulmonary embolism.   2.  No acute abnormality within the chest.   3.  Free intraperitoneal air. See separate abdomen and pelvis CT report for further details.   4.  Atherosclerosis.      Fleischner Society pulmonary nodule recommendations:   Not applicable      DX-CHEST-PORTABLE (1 VIEW)   Final Result      No acute cardiopulmonary disease.            COURSE & MEDICAL DECISION MAKING  Pertinent Labs & Imaging studies reviewed. (See chart for details)    This is a pleasant 62-year-old male who presents with profound abdominal pain, shortness of breath and shoulder pain bilaterally.  Here in the emergency department, the patient has no [place] ST elevation myocardial infarction EKG as well as negative troponin.  He has a heart score of 2.  In addition, the patient had elevated D-dimer therefore CTA pulm angiogram was completed was negative for pulmonary embolism at the CT of the abdomen reveals evidence of free intraperitoneal air suspicious of recent perforation.  Is probably likely the patient has a perforated ulcer as he has had gastric surgery in the past.  The patient was discussed with Dr. See, surgeon, who  states he will be ordering antibiotics, who taken the patient to the operating room for evaluation.  Received IV fluids, IV antibiotics held per Dr. See, IV pain medication was given for morphine 4 mg IV x3.     CRITICAL CARE  The very real possibilty of a deterioration of this patient's condition required the highest level of my preparedness for sudden, emergent intervention.  I provided critical care services, which included medication orders, frequent reevaluations of the patient's condition and response to treatment, ordering and reviewing test results, and discussing the case with various consultants.  The critical care time associated with the care of the patient was 35 minutes. Review chart for interventions. This time is exclusive of any other billable procedures.      FINAL IMPRESSION  Perforated bowel   2.  Critical care time 35 minutes    Electronically signed by: Elan Valdez D.O., 12/28/2022 6:30 AM

## 2022-12-28 NOTE — CONSULTS
DATE OF CONSULTATION:  12/28/2022     REFERRING PHYSICIAN:   Elan Valdez D.O.     CONSULTING PHYSICIAN:  Pierce See M.D.     REASON FOR CONSULTATION:  I have been asked by  to see the patient in surgical consultation for evaluation of abdominal pain and free air on CT imaging.    HISTORY OF PRESENT ILLNESS: The patient is a 62 year-old smoker with a distant history of a Liborio en Y gastric bypass who presents to the Emergency Department with a 4- hour history of severe epigastric abdominal pain. The pain is associated with abdominal distension. He denies any history of ulcer, gastritis, cholangitis, pancreatitis, hepatitis, or previous abdominal ailments. He uses NSAID's occasionally.    PAST MEDICAL HISTORY:  has a past medical history of Arthritis, Dental disorder, and Right knee pain (11/2022).    He has no past medical history of Diabetes (HCC), Hyperlipidemia, or Hypertension.    PAST SURGICAL HISTORY:  has a past surgical history that includes gastric bypass laparoscopic; knee arthroscopy (Left); pr knee scope,single menisectomy (Right, 11/23/2022); and loose body removal (Right, 11/23/2022).    ALLERGIES: No Known Allergies    CURRENT MEDICATIONS:    Home Medications       Reviewed by Quiana Ahumada R.N. (Registered Nurse) on 12/28/22 at 0612  Med List Status: Not Addressed     Medication Last Dose Status   ibuprofen (MOTRIN) 200 MG Tab  Active                  FAMILY HISTORY: family history includes Cancer in his father.    SOCIAL HISTORY:  reports that he has been smoking cigarettes. He has a 5.00 pack-year smoking history. He has never used smokeless tobacco. He reports that he does not currently use drugs. He reports that he does not drink alcohol.    REVIEW OF SYSTEMS: Comprehensive review of systems is negative with the exception of the aforementioned HPI, PMH, and PSH bullets in accordance with CMS guidelines.    PHYSICAL EXAMINATION:    Physical Exam  Vitals and nursing  note reviewed.   Constitutional:       General: He is not in acute distress.     Interventions: Nasal cannula in place.   HENT:      Head: Normocephalic and atraumatic.      Nose: Nose normal.      Mouth/Throat:      Mouth: Mucous membranes are moist.      Pharynx: Oropharynx is clear.   Eyes:      Extraocular Movements: Extraocular movements intact.      Conjunctiva/sclera: Conjunctivae normal.      Pupils: Pupils are equal, round, and reactive to light.   Cardiovascular:      Rate and Rhythm: Regular rhythm. Tachycardia present.      Pulses: Normal pulses.   Pulmonary:      Effort: Pulmonary effort is normal.      Breath sounds: Normal breath sounds.   Chest:      Chest wall: No tenderness.   Abdominal:      General: There is no distension.      Palpations: Abdomen is soft.      Tenderness: There is abdominal tenderness. There is guarding.   Musculoskeletal:         General: No swelling, tenderness or deformity. Normal range of motion.      Cervical back: Normal range of motion and neck supple. No tenderness.   Skin:     General: Skin is warm and dry.      Capillary Refill: Capillary refill takes 2 to 3 seconds.   Neurological:      General: No focal deficit present.      Mental Status: He is alert and oriented to person, place, and time.      Cranial Nerves: No cranial nerve deficit.      Sensory: No sensory deficit.      Motor: No weakness.      Coordination: Coordination normal.   Psychiatric:         Mood and Affect: Mood normal.         Behavior: Behavior normal. Behavior is cooperative.         Thought Content: Thought content normal.         Judgment: Judgment normal.     LABORATORY VALUES:   Recent Labs     12/28/22  0617   WBC 8.4   RBC 4.87   HEMOGLOBIN 13.3*   HEMATOCRIT 41.6*   MCV 85.4   MCH 27.3   MCHC 32.0*   RDW 47.3   PLATELETCT 278   MPV 9.0     Recent Labs     12/28/22  0617   SODIUM 142   POTASSIUM 4.3   CHLORIDE 109   CO2 23   GLUCOSE 119*   BUN 16   CREATININE 0.70   CALCIUM 8.8     Recent  Labs     12/28/22  0617   ASTSGOT 13   ALTSGPT 10   TBILIRUBIN 0.4   ALKPHOSPHAT 83   GLOBULIN 2.7      IMAGING:   CT-ABDOMEN-PELVIS WITH   Final Result         1. Large amount of free intraperitoneal air, concerning for perforated viscus.      2. The exact perforation is not seen but there is wall thickening and stranding in the gastrojejunostomy area as well at the pylorus, possible areas of perforation.      CRITICAL RESULT READ BACK: Preliminary findings discussed with and critical read back performed by Dr. YOLANDA MORALES in the Emergency Department via telephone on 12/28/2022 8:28 AM            CT-CTA CHEST PULMONARY ARTERY W/ RECONS   Final Result         1.  No evidence of pulmonary embolism.   2.  No acute abnormality within the chest.   3.  Free intraperitoneal air. See separate abdomen and pelvis CT report for further details.   4.  Atherosclerosis.      Fleischner Society pulmonary nodule recommendations:   Not applicable      DX-CHEST-PORTABLE (1 VIEW)   Final Result      No acute cardiopulmonary disease.          ASSESSMENT AND PLAN:     Abdominal pain, peritonitis, and free air on CT imaging of the abdomen.  Suspect perforated marginal ulcer given history of gastric bypass, tobacco use, and occasional NSAID use.  I recommend exploratory laparoscopy and repair of perforated viscus..    The operative strategy was explained and reviewed.  I counseled him that we will attempt to perform this laparoscopically, however, open laparotomy for definitive repair, drainage, and reconstruction may be necessary.  Nonsurgical alternatives discussed but not advocated. Potential complications including, but not limited to, infection, bleeding, damage to adjacent structures, mechanical ventilation following surgery, and anesthetic complications were discussed. Questions were elicited and answered to the patient's satisfaction.  Operative consent signed.        ____________________________________     Pierce See  M.D.    DD: 12/28/2022  8:46 AM

## 2022-12-28 NOTE — OR NURSING
Arrived to PACU in stable condition. Site to abdomen CDI. Medicated for pain and nausea with IV medications. NG to right nostril to LIS. He is resting now and doing well. Family updated and belonging brought to bedside.

## 2022-12-28 NOTE — ANESTHESIA PROCEDURE NOTES
Peripheral IV    Date/Time: 12/28/2022 9:58 AM  Performed by: Osiel Harris M.D.  Authorized by: Osiel Harris M.D.     Size:  16 G  Laterality:  Right  Peripheral IV Location:  Forearm  Site Prep:  Alcohol  Technique:  Direct puncture  Attempts:  1

## 2022-12-28 NOTE — ANESTHESIA POSTPROCEDURE EVALUATION
Patient: Kris Lewis    Procedure Summary     Date: 12/28/22 Room / Location: [place] [address] / SURGERY [facility]    Anesthesia Start: 0943 Anesthesia Stop: 1101    Procedures:       DIAGNOSTIC LAPAROSCOPY (Abdomen)      LAPAROTOMY, EXPLORATORY (Abdomen) Diagnosis: (marginal ulcer, perforated gastrojejenostomy)    Surgeons: Pierce See M.D. Responsible Provider: Osiel Harris M.D.    Anesthesia Type: general ASA Status: 2 - Emergent          Final Anesthesia Type: general  Last vitals  BP   Blood Pressure: 136/69    Temp   37.3 °C (99.1 °F)    Pulse   86   Resp   16    SpO2   90 %      Anesthesia Post Evaluation    Patient location during evaluation: PACU  Patient participation: complete - patient participated  Level of consciousness: awake and alert    Airway patency: patent  Anesthetic complications: no  Cardiovascular status: hemodynamically stable  Respiratory status: acceptable  Hydration status: euvolemic    PONV: none          No notable events documented.     Nurse Pain Score: 5 (NPRS)

## 2022-12-28 NOTE — ANESTHESIA PREPROCEDURE EVALUATION
Case: [id number] Date/Time: 12/28/22 0930    Procedures:       DIAGNOSTIC LAPAROSCOPY      LAPAROTOMY, EXPLORATORY    Location: [facility] OR 09 / SURGERY [facility]    Surgeons: Pierce See M.D.          Relevant Problems   No relevant active problems       Physical Exam    Airway   Mallampati: II  TM distance: >3 FB  Neck ROM: full       Cardiovascular - normal exam  Rhythm: regular  Rate: normal  (-) murmur     Dental - normal exam  (+) upper dentures, lower dentures           Pulmonary - normal exam  Breath sounds clear to auscultation     Abdominal    Neurological - normal exam                 Anesthesia Plan    ASA 2- EMERGENT   ASA physical status emergent criteria: acute peritonitis    Plan - general       Airway plan will be ETT          Induction: intravenous    Postoperative Plan: Postoperative administration of opioids is intended.    Pertinent diagnostic labs and testing reviewed    Informed Consent:    Anesthetic plan and risks discussed with patient.    Use of blood products discussed with: patient whom consented to blood products.

## 2022-12-28 NOTE — OP REPORT
DATE OF OPERATION:   12/28/2022     PREOPERATIVE DIAGNOSIS: Peritonitis.  Free intraperitoneal air on CT imaging.    POSTOPERATIVE DIAGNOSIS: Perforated marginal ulcer (prior Liborio-en-Y gastrojejunostomy)    PROCEDURE PERFORMED: Laparoscopic repair of perforated gastrojejunostomy marginal ulcer with omental patch and external drainage.    SURGEON:    Pierce See M.D.    ANESTHESIOLOGIST:  Osiel Harris M.D.    ANESTHESIA:   General endotracheal anesthesia.    ASA CLASSIFICATION:  II. Emergent.    INDICATIONS: The patient is a 62 year-old man with clinical and radiographic findings of  a perforated Liborio-en-Y gastrojejunostomy . He is taken to the operating room for planned laparoscopic repair and drainage.     FINDINGS: Focal perforation of the medial and anterior aspect of the Liborio-en-Y gastrojejunostomy.  Minimal localized enteric contamination.    WOUND CLASSIFICATION:  Class IV, Dirty or Infected. Infection present at the time of surgery (PATOS).    SPECIMEN:    None.    ESTIMATED BLOOD LOSS:  Less than 10 mL.    PROCEDURE: Following informed consent consent, the patient was properly identified, taken to the operating room and placed in supine position where general endotracheal anesthesia was administered. Intravenous antibiotics were administered by the anesthesiologist in correct time interval. A Duong catheter was aseptically placed. Sequential compression devices were employed. The abdomen was prepped and draped into a sterile field. A timeout was conducted with the full attention and participation of all operating room personnel.    Marcaine 0.5% was used to infiltrate the port sites. A 5 mm infraumbilical midline incision was made and the subcutaneous tissues spread bluntly. The fascia was elevated with a hook and a Veress needle was atraumatically inserted. Carbon dioxide pneumoperitoneum was instilled. A 5 mm separator port was passed. A 5 mm 30 degree lens and camera was passed into the  peritoneal cavity. A 5 mm right subcostal port was placed under direct vision in the right subcostal region.  An 11 mm port was placed in the left epigastric midline under direct vision.  A Irish retractor was passed through a high epigastric incision.  The left lobe of the liver was reflected to the right and cephalad and secured with a Robo arm fixed retractor system.     The site of perforation was readily identified.  The minimal enteric soilage was evacuated with suction.  A nasogastric tube was passed and manipulated into the proximal alimentary limb under direct laparoscopic vision.  The perforation was primarily repaired with a running 2-0 STRATAFIX™ Spiral PDS Plus Suture suture.  The repair was buttressed with an omental flap using the same suture. A 19 mm round channel drain was placed adjacent to the repair and secured to the skin at the right subcostal port site with a single 3-0 PERMAHAND® Silk suture.    The Irish retractor was removed atraumatically under direct vision. The abdomen was desufflated and the ports were removed. The left upper quadrant port site fascia was approximated with a single 2-0 VICRYL® Plus Antibacterial suture.The port site skin incisions were closed with interrupted 4-0 VICRYL® Plus Antibacterial subcuticular sutures. A DERMABOND ADVANCED® Topical Skin Adhesive dressing was applied.    The patient tolerated the procedure well, and there were no apparent complications. All sponge, needle, and instrument counts were correct on 2 separate occasions. The patient was awakened, extubated, and transferred to  to the post anesthesia care unit (PACU) in satisfactory condition.       ____________________________________     Pierce See M.D.    DD: 12/28/2022  11:15 AM

## 2022-12-29 ENCOUNTER — APPOINTMENT (OUTPATIENT)
Dept: RADIOLOGY | Facility: MEDICAL CENTER | Age: 62
DRG: 326 | End: 2022-12-29
Attending: NURSE PRACTITIONER
Payer: COMMERCIAL

## 2022-12-29 PROBLEM — Z78.9 NO CONTRAINDICATION TO DEEP VEIN THROMBOSIS (DVT) PROPHYLAXIS: Status: ACTIVE | Noted: 2022-12-29

## 2022-12-29 LAB
ANION GAP SERPL CALC-SCNC: 6 MMOL/L (ref 7–16)
APPEARANCE UR: CLEAR
BACTERIA #/AREA URNS HPF: NEGATIVE /HPF
BASOPHILS # BLD AUTO: 0.1 % (ref 0–1.8)
BASOPHILS # BLD: 0.01 K/UL (ref 0–0.12)
BILIRUB UR QL STRIP.AUTO: NEGATIVE
BUN SERPL-MCNC: 16 MG/DL (ref 8–22)
CALCIUM SERPL-MCNC: 8.4 MG/DL (ref 8.5–10.5)
CHLORIDE SERPL-SCNC: 106 MMOL/L (ref 96–112)
CO2 SERPL-SCNC: 28 MMOL/L (ref 20–33)
COLOR UR: YELLOW
CREAT SERPL-MCNC: 0.72 MG/DL (ref 0.5–1.4)
EOSINOPHIL # BLD AUTO: 0.02 K/UL (ref 0–0.51)
EOSINOPHIL NFR BLD: 0.2 % (ref 0–6.9)
EPI CELLS #/AREA URNS HPF: NEGATIVE /HPF
ERYTHROCYTE [DISTWIDTH] IN BLOOD BY AUTOMATED COUNT: 50 FL (ref 35.9–50)
GFR SERPLBLD CREATININE-BSD FMLA CKD-EPI: 104 ML/MIN/1.73 M 2
GLUCOSE BLD STRIP.AUTO-MCNC: 109 MG/DL (ref 65–99)
GLUCOSE BLD STRIP.AUTO-MCNC: 126 MG/DL (ref 65–99)
GLUCOSE SERPL-MCNC: 110 MG/DL (ref 65–99)
GLUCOSE UR STRIP.AUTO-MCNC: NEGATIVE MG/DL
HCT VFR BLD AUTO: 36.5 % (ref 42–52)
HGB BLD-MCNC: 11.4 G/DL (ref 14–18)
HYALINE CASTS #/AREA URNS LPF: ABNORMAL /LPF
IMM GRANULOCYTES # BLD AUTO: 0.05 K/UL (ref 0–0.11)
IMM GRANULOCYTES NFR BLD AUTO: 0.5 % (ref 0–0.9)
KETONES UR STRIP.AUTO-MCNC: NEGATIVE MG/DL
LEUKOCYTE ESTERASE UR QL STRIP.AUTO: NEGATIVE
LYMPHOCYTES # BLD AUTO: 1.54 K/UL (ref 1–4.8)
LYMPHOCYTES NFR BLD: 14.8 % (ref 22–41)
MAGNESIUM SERPL-MCNC: 1.7 MG/DL (ref 1.5–2.5)
MCH RBC QN AUTO: 27.3 PG (ref 27–33)
MCHC RBC AUTO-ENTMCNC: 31.2 G/DL (ref 33.7–35.3)
MCV RBC AUTO: 87.3 FL (ref 81.4–97.8)
MICRO URNS: ABNORMAL
MONOCYTES # BLD AUTO: 1.04 K/UL (ref 0–0.85)
MONOCYTES NFR BLD AUTO: 10 % (ref 0–13.4)
NEUTROPHILS # BLD AUTO: 7.78 K/UL (ref 1.82–7.42)
NEUTROPHILS NFR BLD: 74.4 % (ref 44–72)
NITRITE UR QL STRIP.AUTO: NEGATIVE
NRBC # BLD AUTO: 0 K/UL
NRBC BLD-RTO: 0 /100 WBC
PH UR STRIP.AUTO: 5.5 [PH] (ref 5–8)
PHOSPHATE SERPL-MCNC: 2.9 MG/DL (ref 2.5–4.5)
PLATELET # BLD AUTO: 230 K/UL (ref 164–446)
PMV BLD AUTO: 9.6 FL (ref 9–12.9)
POTASSIUM SERPL-SCNC: 4.4 MMOL/L (ref 3.6–5.5)
PROT UR QL STRIP: NEGATIVE MG/DL
RBC # BLD AUTO: 4.18 M/UL (ref 4.7–6.1)
RBC # URNS HPF: ABNORMAL /HPF
RBC UR QL AUTO: ABNORMAL
SODIUM SERPL-SCNC: 140 MMOL/L (ref 135–145)
SP GR UR STRIP.AUTO: 1.03
UROBILINOGEN UR STRIP.AUTO-MCNC: 0.2 MG/DL
WBC # BLD AUTO: 10.4 K/UL (ref 4.8–10.8)
WBC #/AREA URNS HPF: ABNORMAL /HPF

## 2022-12-29 PROCEDURE — 700111 HCHG RX REV CODE 636 W/ 250 OVERRIDE (IP): Performed by: SURGERY

## 2022-12-29 PROCEDURE — 700105 HCHG RX REV CODE 258: Performed by: SURGERY

## 2022-12-29 PROCEDURE — 36415 COLL VENOUS BLD VENIPUNCTURE: CPT

## 2022-12-29 PROCEDURE — 700111 HCHG RX REV CODE 636 W/ 250 OVERRIDE (IP): Performed by: NURSE PRACTITIONER

## 2022-12-29 PROCEDURE — 85025 COMPLETE CBC W/AUTO DIFF WBC: CPT

## 2022-12-29 PROCEDURE — C9113 INJ PANTOPRAZOLE SODIUM, VIA: HCPCS | Performed by: SURGERY

## 2022-12-29 PROCEDURE — 700102 HCHG RX REV CODE 250 W/ 637 OVERRIDE(OP): Performed by: NURSE PRACTITIONER

## 2022-12-29 PROCEDURE — 84100 ASSAY OF PHOSPHORUS: CPT

## 2022-12-29 PROCEDURE — 80048 BASIC METABOLIC PNL TOTAL CA: CPT

## 2022-12-29 PROCEDURE — 82962 GLUCOSE BLOOD TEST: CPT | Mod: 91

## 2022-12-29 PROCEDURE — 81001 URINALYSIS AUTO W/SCOPE: CPT

## 2022-12-29 PROCEDURE — 83735 ASSAY OF MAGNESIUM: CPT

## 2022-12-29 PROCEDURE — 99024 POSTOP FOLLOW-UP VISIT: CPT | Performed by: NURSE PRACTITIONER

## 2022-12-29 PROCEDURE — 770001 HCHG ROOM/CARE - MED/SURG/GYN PRIV*

## 2022-12-29 PROCEDURE — A9270 NON-COVERED ITEM OR SERVICE: HCPCS | Performed by: NURSE PRACTITIONER

## 2022-12-29 RX ORDER — ONDANSETRON 4 MG/1
4 TABLET, ORALLY DISINTEGRATING ORAL EVERY 4 HOURS PRN
Status: DISCONTINUED | OUTPATIENT
Start: 2022-12-29 | End: 2023-01-01

## 2022-12-29 RX ORDER — DOCUSATE SODIUM 50 MG/5ML
100 LIQUID ORAL 2 TIMES DAILY
Status: DISCONTINUED | OUTPATIENT
Start: 2022-12-29 | End: 2023-01-01

## 2022-12-29 RX ORDER — AMOXICILLIN 250 MG
1 CAPSULE ORAL
Status: DISCONTINUED | OUTPATIENT
Start: 2022-12-29 | End: 2023-01-01

## 2022-12-29 RX ORDER — AMOXICILLIN 250 MG
1 CAPSULE ORAL NIGHTLY
Status: DISCONTINUED | OUTPATIENT
Start: 2022-12-29 | End: 2023-01-01

## 2022-12-29 RX ORDER — POLYETHYLENE GLYCOL 3350 17 G/17G
1 POWDER, FOR SOLUTION ORAL 2 TIMES DAILY
Status: DISCONTINUED | OUTPATIENT
Start: 2022-12-29 | End: 2023-01-01

## 2022-12-29 RX ORDER — HYDROMORPHONE HYDROCHLORIDE 1 MG/ML
0.5 INJECTION, SOLUTION INTRAMUSCULAR; INTRAVENOUS; SUBCUTANEOUS
Status: DISCONTINUED | OUTPATIENT
Start: 2022-12-29 | End: 2023-01-01

## 2022-12-29 RX ORDER — OXYCODONE HYDROCHLORIDE 5 MG/1
10 TABLET ORAL
Status: DISCONTINUED | OUTPATIENT
Start: 2022-12-29 | End: 2023-01-01

## 2022-12-29 RX ORDER — OXYCODONE HYDROCHLORIDE 5 MG/1
5 TABLET ORAL
Status: DISCONTINUED | OUTPATIENT
Start: 2022-12-29 | End: 2023-01-01

## 2022-12-29 RX ADMIN — SENNOSIDES AND DOCUSATE SODIUM 1 TABLET: 50; 8.6 TABLET ORAL at 20:48

## 2022-12-29 RX ADMIN — SODIUM CHLORIDE: 900 INJECTION INTRAVENOUS at 23:25

## 2022-12-29 RX ADMIN — HYDROMORPHONE HYDROCHLORIDE 0.5 MG: 1 INJECTION, SOLUTION INTRAMUSCULAR; INTRAVENOUS; SUBCUTANEOUS at 08:32

## 2022-12-29 RX ADMIN — PIPERACILLIN AND TAZOBACTAM 4.5 G: 4; .5 INJECTION, POWDER, LYOPHILIZED, FOR SOLUTION INTRAVENOUS; PARENTERAL at 13:49

## 2022-12-29 RX ADMIN — HYDROMORPHONE HYDROCHLORIDE 0.5 MG: 1 INJECTION, SOLUTION INTRAMUSCULAR; INTRAVENOUS; SUBCUTANEOUS at 11:56

## 2022-12-29 RX ADMIN — SODIUM CHLORIDE: 900 INJECTION INTRAVENOUS at 15:16

## 2022-12-29 RX ADMIN — SODIUM CHLORIDE: 900 INJECTION INTRAVENOUS at 08:04

## 2022-12-29 RX ADMIN — HYDROMORPHONE HYDROCHLORIDE 0.5 MG: 1 INJECTION, SOLUTION INTRAMUSCULAR; INTRAVENOUS; SUBCUTANEOUS at 00:29

## 2022-12-29 RX ADMIN — ENOXAPARIN SODIUM 40 MG: 40 INJECTION SUBCUTANEOUS at 18:14

## 2022-12-29 RX ADMIN — HYDROMORPHONE HYDROCHLORIDE 0.5 MG: 1 INJECTION, SOLUTION INTRAMUSCULAR; INTRAVENOUS; SUBCUTANEOUS at 05:12

## 2022-12-29 RX ADMIN — PANTOPRAZOLE SODIUM 40 MG: 40 INJECTION, POWDER, FOR SOLUTION INTRAVENOUS at 05:12

## 2022-12-29 RX ADMIN — PIPERACILLIN AND TAZOBACTAM 4.5 G: 4; .5 INJECTION, POWDER, LYOPHILIZED, FOR SOLUTION INTRAVENOUS; PARENTERAL at 05:14

## 2022-12-29 RX ADMIN — SODIUM CHLORIDE: 900 INJECTION INTRAVENOUS at 00:31

## 2022-12-29 RX ADMIN — HYDROMORPHONE HYDROCHLORIDE 0.5 MG: 1 INJECTION, SOLUTION INTRAMUSCULAR; INTRAVENOUS; SUBCUTANEOUS at 18:24

## 2022-12-29 RX ADMIN — PANTOPRAZOLE SODIUM 40 MG: 40 INJECTION, POWDER, FOR SOLUTION INTRAVENOUS at 18:14

## 2022-12-29 RX ADMIN — PIPERACILLIN AND TAZOBACTAM 4.5 G: 4; .5 INJECTION, POWDER, LYOPHILIZED, FOR SOLUTION INTRAVENOUS; PARENTERAL at 20:53

## 2022-12-29 RX ADMIN — HYDROMORPHONE HYDROCHLORIDE 0.5 MG: 1 INJECTION, SOLUTION INTRAMUSCULAR; INTRAVENOUS; SUBCUTANEOUS at 15:12

## 2022-12-29 RX ADMIN — OXYCODONE HYDROCHLORIDE 10 MG: 5 TABLET ORAL at 21:37

## 2022-12-29 ASSESSMENT — PAIN DESCRIPTION - PAIN TYPE
TYPE: ACUTE PAIN;SURGICAL PAIN
TYPE: ACUTE PAIN
TYPE: ACUTE PAIN;SURGICAL PAIN
TYPE: ACUTE PAIN
TYPE: ACUTE PAIN
TYPE: ACUTE PAIN;SURGICAL PAIN
TYPE: ACUTE PAIN
TYPE: ACUTE PAIN;SURGICAL PAIN
TYPE: ACUTE PAIN

## 2022-12-29 ASSESSMENT — FIBROSIS 4 INDEX: FIB4 SCORE: 0.9

## 2022-12-29 NOTE — DIETARY
"Nutrition Support Assessment:  Day 1 of admit.  Kris Lewis is a 62 y.o. male with admitting DX of Chronic marginal ulcer with perforation.         Assessment:  Estimated Nutritional Needs based on:   Height: 182.9 cm (6' 0.01\") (copied from last entry)  Weight: 89.8 kg (197 lb 15.6 oz)  Weight to Use in Calculations: 89.8 kg (197 lb 15.6 oz)- [facility]  Ideal Body Weight: 80.7 kg (178 lb)  Percent Ideal Body Weight: 111.2  Body mass index is 26.84 kg/m²., BMI classification: Overweight.     Estimated needs:  Calculation/Equation: MSJ X 1.2=   Total Calories / day: 6655-4922  (Calories / kg: ~23-26)  Total Grams Protein / day: 108 - 126  (Grams Protein / k.2 - 1.4)  Total Free Water: 0767-9218 ml/day (~30-35 ml/kg)     Evaluation:   TF consult received for jejunum trickle feeds. Pt s/p Laparoscopic repair of perforated gastrojejunostomy marginal ulcer with omental patch and external drainage.  Per KUB today: Enteric feeding tube position consistent with intragastric placement. Reviewed placement with surgery APRN who reports pt with history of kirti en y and tube is past the pouch, okay for trickle feeds; plan is to keep him NPO  for 4 days and then do UGI to check for leak.   Labs reviewed.  Pertinent Meds: Dulcolax PRN, Colace twice daily, MOM daily, Zofran PRN, Zosyn, Miralax twice daily, Pericolace/Senokot every 24 hours PRN/nightly, and  Sodium phosphate enema PRN.   Will start with elemental TF formula to help optimize tolerance and intake while on trickle feeds.  No poor PO/wt loss per admit screen. Pt reports UBW ~ 185# and confirmed his usual adequate PO intake PTA, states he is a snacker and eats throughout the day.      Malnutrition Risk: Does not meet ASPEN criteria.      Recommendations/Plan:  Begin TF with trickle feeds with Impact Peptide 1.5  @ 10 ml/hr, hold at this rate.   If MD wants to advance TF, consider Impact Peptide 1.5 @ goal rate of  60 ml/hr per TF protocol to " provide  2160 kcals/day, 135 g pro/day, and 1109 ml free water/day.   Advance diet as okay'd by MD.  Fluids per MD.     RD following.

## 2022-12-29 NOTE — ASSESSMENT & PLAN NOTE
Admission imaging with free intraperitoneal air.  Perforated marginal ulcer (prior Liborio-en-Y gastrojejunostomy).  12/28 Laparoscopic repair of perforated gastrojejunostomy marginal ulcer with omental patch and external drainage.  12/29 Start jejunum trickle TFs. Zosyn.  1/1 Plan for UGI without evidence of leak. Remove NGT. Initiate full liqiud diet.  - 4 day course Zosyn completed.  - Continue omeprazole BID for 4 weeks, then once daily.  - Plan to remove JAYLYN prior to discharge.  [facility].

## 2022-12-29 NOTE — PROGRESS NOTES
Report received from darrin RN, assumed care at 1900.  Assessment complete.  A&O x 4. Patient calls appropriately.  Patient has not ambulated since arrival to floor.   Patient has 6/10 pain. Pain managed with prescribed medications.  Denies N&V. Strict NPO, R nare Ng to low continuous suction.  Surgical lap sites x2 dermabond. RUQ JAYLYN drain.  + void via richmond catheter, - flatus, - BM.  Patient denies SOB. On 2L oxymask.  Patient calm, pleasant, and cooperative.  Review plan with of care with patient. Call light and personal belongings within reach. Hourly rounding in place. All needs met at this time.

## 2022-12-29 NOTE — CARE PLAN
"The patient is Stable - Low risk of patient condition declining or worsening    Shift Goals  Clinical Goals: pain control, NG to suction; remain NPO, administer IV fluids  Patient Goals: \"I'm okay.\"  Family Goals: not present at this time    Progress made toward(s) clinical / shift goals:  Patient remains NPO. NG tube is set to low continuous suction. Patient is tolerating IV fluids. Pain medication administered per MAR.     Patient is not progressing towards the following goals: Patient has not yet been cleared to discharge.     Problem: Pain - Standard  Goal: Alleviation of pain or a reduction in pain to the patient’s comfort goal  Outcome: Progressing     Problem: Knowledge Deficit - Standard  Goal: Patient and family/care givers will demonstrate understanding of plan of care, disease process/condition, diagnostic tests and medications  Outcome: Progressing         "

## 2022-12-29 NOTE — CARE PLAN
The patient is Stable - Low risk of patient condition declining or worsening    Shift Goals  Clinical Goals: Pain control, monitor drains  Patient Goals: Pain control  Family Goals: not present at this time    Progress made toward(s) clinical / shift goals:  Pain medication administered PRN, patient able to sleep after meds. Drains monitored, no new changes    Patient is not progressing towards the following goals:

## 2022-12-29 NOTE — DISCHARGE PLANNING
Case Management Discharge Planning    Admission Date: 12/28/2022  GMLOS: 9.4  ALOS: 1    6-Clicks ADL Score: 18  6-Clicks Mobility Score: 18      Anticipated Discharge Dispo: Discharge Disposition: Discharged to home/self care (01)  Discharge Address: [address] [place] [address] [address] [address]  Discharge Contact Phone Number: [phone]    DME Needed: No    Action(s) Taken: CM met with patient at bedside to complete initial assessment and discuss DCP.  Patient, Mary, reported living in a 2nd floor apartment with his girlfriend, Sandra Barbour #[phone], who will provide return transportation to residence upon discharge.  Prior to admission patient was employed, independent with ADLs, driving, and ambulating with a cane due to recent (11/2022) right knee surgery.   Denies any other DME and not active with [facility].      Patient disclosed history of ETOH abuse & remote marijuana use (40 years ago); He has been sober for 4 years.     No anticipated DC needs identified at this time.     Escalations Completed: None    Medically Clear: No    Next Steps: CM will f/u for medical clearance & remains available for assistance with DCP.    Barriers to Discharge: Medical clearance    Is the patient up for discharge tomorrow: No    Care Transition Team Assessment    Information Source  Orientation Level: Oriented X4  Information Given By: Patient  Who is responsible for making decisions for patient? : Patient     Elopement Risk  Legal Hold: No  Ambulatory or Self Mobile in Wheelchair: Yes  Disoriented: No  Psychiatric Symptoms: None  History of Wandering: No  Elopement this Admit: No  Vocalizing Wanting to Leave: No  Displays Behaviors, Body Language Wanting to Leave: No-Not at Risk for Elopement  Elopement Risk: Not at Risk for Elopement    Interdisciplinary Discharge Planning  Lives with - Patient's Self Care Capacity: Alone and Able to Care For Self, Significant Other  Patient or legal guardian wants to designate a caregiver:  No  Support Systems: Spouse / Significant Other  Housing / Facility: 2 Story Apartment / Condo  Durable Medical Equipment: Not Applicable    Discharge Preparedness  What is your plan after discharge?: Home with help  What are your discharge supports?: Other (comment) (Girlfriend, Sandra Barbour)  Prior Functional Level: Ambulatory, Drives Self, Independent with Activities of Daily Living    Functional Assesment  Prior Functional Level: Ambulatory, Drives Self, Independent with Activities of Daily Living    Finances  Financial Barriers to Discharge: No  Prescription Coverage: Yes    Vision / Hearing Impairment  Vision Impairment : No  Hearing Impairment : No     Advance Directive  Advance Directive?: Living Will    Domestic Abuse  Have you ever been the victim of abuse or violence?: No  Physical Abuse or Sexual Abuse: No  Verbal Abuse or Emotional Abuse: No  Possible Abuse/Neglect Reported to:: Not Applicable    Psychological Assessment  History of Substance Abuse: Marijuana, Alcohol  Date Last Used - Alcohol: 4 years ago  Date Last Used - Marijuana: 40 years ago  History of Psychiatric Problems: No  Non-compliant with Treatment: No  Newly Diagnosed Illness: Yes    Discharge Risks or Barriers  Discharge risks or barriers?: No    Anticipated Discharge Information  Discharge Disposition: Discharged to home/self care (01)  Discharge Address: [address] [place] [address] [address],  [place] [address]  Discharge Contact Phone Number: [phone]

## 2022-12-29 NOTE — PROGRESS NOTES
4 Eyes Skin Assessment Completed by ALAN Crowell and ALAN Balderas     Head WDL  Ears WDL  Nose WDL, R nare NG tube in place  Mouth WDL  Neck WDL  Breast/Chest WDL   Shoulder Blades WDL   Spine  WDL  (R) Arm/Elbow/Hand WDL, PIVx2 in place  (L) Arm/Elbow/Hand WDL  Abdomen: lap sites x2 dermabond. RUQ JAYLYN  Groin: [place] in place  Scrotum/Coccyx/Buttocks WDL  (R) Leg WDL  (L) Leg WDL  (R) Heel/Foot/Toe WDL  (L) Heel/Foot/Toe WDL              Devices In Places: richmond, NG tube, oxymask,         Interventions In Place n/a        Pictures Uploaded Into Epic n/a  Wound Consult Placed n/a  RN Wound Prevention Protocol Ordered n/a

## 2022-12-29 NOTE — PROGRESS NOTES
DATE: 12/29/2022 12/27 Laparoscopic repair of perforated gastrojejunostomy marginal ulcer with omental patch and external drainage.    INTERVAL EVENTS:  Sore this morning, denies N/V.    PHYSICAL EXAMINATION:  Vital Signs: /60   Pulse 74   Temp 36.7 °C (98.1 °F) (Temporal)   Resp 16   Ht 1.829 m (6')   Wt 89.8 kg (197 lb 15.6 oz)   SpO2 91%     NGT to LCWS with minimal output.  Abdomen soft, port sites c/d/i with skin glue in place.  RLQ JAYLYN with serosang output.    LABORATORY VALUES:   Recent Labs     12/28/22  0617   WBC 8.4   RBC 4.87   HEMOGLOBIN 13.3*   HEMATOCRIT 41.6*   MCV 85.4   MCH 27.3   MCHC 32.0*   RDW 47.3   PLATELETCT 278   MPV 9.0     Recent Labs     12/28/22  0617   SODIUM 142   POTASSIUM 4.3   CHLORIDE 109   CO2 23   GLUCOSE 119*   BUN 16   CREATININE 0.70   CALCIUM 8.8     Recent Labs     12/28/22  0617   ASTSGOT 13   ALTSGPT 10   TBILIRUBIN 0.4   ALKPHOSPHAT 83   GLOBULIN 2.7            IMAGING:   CT-ABDOMEN-PELVIS WITH   Final Result         1. Large amount of free intraperitoneal air, concerning for perforated viscus.      2. The exact perforation is not seen but there is wall thickening and stranding in the gastrojejunostomy area as well at the pylorus, possible areas of perforation.      CRITICAL RESULT READ BACK: Preliminary findings discussed with and critical read back performed by Dr. YOLANDA MORALES in the Emergency Department via telephone on 12/28/2022 8:28 AM            CT-CTA CHEST PULMONARY ARTERY W/ RECONS   Final Result         1.  No evidence of pulmonary embolism.   2.  No acute abnormality within the chest.   3.  Free intraperitoneal air. See separate abdomen and pelvis CT report for further details.   4.  Atherosclerosis.      Fleischner Society pulmonary nodule recommendations:   Not applicable      DX-CHEST-PORTABLE (1 VIEW)   Final Result      No acute cardiopulmonary disease.          ASSESSMENT AND PLAN:   * Chronic marginal ulcer with perforation  (HCC)- (present on admission)  Assessment & Plan  Admission imaging with free intraperitoneal air.  Perforated marginal ulcer (prior Liborio-en-Y gastrojejunostomy).  12/28 Laparoscopic repair of perforated gastrojejunostomy marginal ulcer with omental patch and external drainage.  NPO/NGT to ALLA.  [place].  [facility].    No contraindication to deep vein thrombosis (DVT) prophylaxis- (present on admission)  Assessment & Plan  Prophylactic dose enoxaparin initiated upon admission.           ____________________________________     TRISHA Kaminski    DD: 12/29/2022  8:04 AM

## 2022-12-30 ENCOUNTER — APPOINTMENT (OUTPATIENT)
Dept: RADIOLOGY | Facility: MEDICAL CENTER | Age: 62
DRG: 326 | End: 2022-12-30
Attending: SURGERY
Payer: COMMERCIAL

## 2022-12-30 LAB
ANION GAP SERPL CALC-SCNC: 10 MMOL/L (ref 7–16)
BASOPHILS # BLD AUTO: 0.5 % (ref 0–1.8)
BASOPHILS # BLD: 0.05 K/UL (ref 0–0.12)
BUN SERPL-MCNC: 15 MG/DL (ref 8–22)
CALCIUM SERPL-MCNC: 8.1 MG/DL (ref 8.5–10.5)
CHLORIDE SERPL-SCNC: 106 MMOL/L (ref 96–112)
CO2 SERPL-SCNC: 23 MMOL/L (ref 20–33)
CREAT SERPL-MCNC: 0.7 MG/DL (ref 0.5–1.4)
CRP SERPL HS-MCNC: 11.7 MG/DL (ref 0–0.75)
EOSINOPHIL # BLD AUTO: 0.05 K/UL (ref 0–0.51)
EOSINOPHIL NFR BLD: 0.5 % (ref 0–6.9)
ERYTHROCYTE [DISTWIDTH] IN BLOOD BY AUTOMATED COUNT: 50.2 FL (ref 35.9–50)
GFR SERPLBLD CREATININE-BSD FMLA CKD-EPI: 105 ML/MIN/1.73 M 2
GLUCOSE SERPL-MCNC: 96 MG/DL (ref 65–99)
HCT VFR BLD AUTO: 33.9 % (ref 42–52)
HGB BLD-MCNC: 10.6 G/DL (ref 14–18)
IMM GRANULOCYTES # BLD AUTO: 0.05 K/UL (ref 0–0.11)
IMM GRANULOCYTES NFR BLD AUTO: 0.5 % (ref 0–0.9)
LYMPHOCYTES # BLD AUTO: 1.7 K/UL (ref 1–4.8)
LYMPHOCYTES NFR BLD: 18.4 % (ref 22–41)
MCH RBC QN AUTO: 27.2 PG (ref 27–33)
MCHC RBC AUTO-ENTMCNC: 31.3 G/DL (ref 33.7–35.3)
MCV RBC AUTO: 87.1 FL (ref 81.4–97.8)
MONOCYTES # BLD AUTO: 0.96 K/UL (ref 0–0.85)
MONOCYTES NFR BLD AUTO: 10.4 % (ref 0–13.4)
NEUTROPHILS # BLD AUTO: 6.43 K/UL (ref 1.82–7.42)
NEUTROPHILS NFR BLD: 69.7 % (ref 44–72)
NRBC # BLD AUTO: 0 K/UL
NRBC BLD-RTO: 0 /100 WBC
PLATELET # BLD AUTO: 209 K/UL (ref 164–446)
PMV BLD AUTO: 9.7 FL (ref 9–12.9)
POTASSIUM SERPL-SCNC: 3.8 MMOL/L (ref 3.6–5.5)
PREALB SERPL-MCNC: 11.6 MG/DL (ref 18–38)
RBC # BLD AUTO: 3.89 M/UL (ref 4.7–6.1)
SODIUM SERPL-SCNC: 139 MMOL/L (ref 135–145)
WBC # BLD AUTO: 9.2 K/UL (ref 4.8–10.8)

## 2022-12-30 PROCEDURE — 700111 HCHG RX REV CODE 636 W/ 250 OVERRIDE (IP): Performed by: NURSE PRACTITIONER

## 2022-12-30 PROCEDURE — 36415 COLL VENOUS BLD VENIPUNCTURE: CPT

## 2022-12-30 PROCEDURE — 770001 HCHG ROOM/CARE - MED/SURG/GYN PRIV*

## 2022-12-30 PROCEDURE — 99024 POSTOP FOLLOW-UP VISIT: CPT | Performed by: SURGERY

## 2022-12-30 PROCEDURE — 700102 HCHG RX REV CODE 250 W/ 637 OVERRIDE(OP): Performed by: NURSE PRACTITIONER

## 2022-12-30 PROCEDURE — C9113 INJ PANTOPRAZOLE SODIUM, VIA: HCPCS | Performed by: SURGERY

## 2022-12-30 PROCEDURE — 700111 HCHG RX REV CODE 636 W/ 250 OVERRIDE (IP): Performed by: SURGERY

## 2022-12-30 PROCEDURE — 86140 C-REACTIVE PROTEIN: CPT

## 2022-12-30 PROCEDURE — 80048 BASIC METABOLIC PNL TOTAL CA: CPT

## 2022-12-30 PROCEDURE — 84134 ASSAY OF PREALBUMIN: CPT

## 2022-12-30 PROCEDURE — A9270 NON-COVERED ITEM OR SERVICE: HCPCS | Performed by: NURSE PRACTITIONER

## 2022-12-30 PROCEDURE — 700105 HCHG RX REV CODE 258: Performed by: SURGERY

## 2022-12-30 PROCEDURE — 85025 COMPLETE CBC W/AUTO DIFF WBC: CPT

## 2022-12-30 RX ADMIN — OXYCODONE HYDROCHLORIDE 10 MG: 5 TABLET ORAL at 16:47

## 2022-12-30 RX ADMIN — HYDROMORPHONE HYDROCHLORIDE 0.5 MG: 1 INJECTION, SOLUTION INTRAMUSCULAR; INTRAVENOUS; SUBCUTANEOUS at 10:09

## 2022-12-30 RX ADMIN — DOCUSATE SODIUM 100 MG: 50 LIQUID ORAL at 05:10

## 2022-12-30 RX ADMIN — OXYCODONE HYDROCHLORIDE 10 MG: 5 TABLET ORAL at 20:29

## 2022-12-30 RX ADMIN — SODIUM CHLORIDE: 900 INJECTION INTRAVENOUS at 18:48

## 2022-12-30 RX ADMIN — PIPERACILLIN AND TAZOBACTAM 4.5 G: 4; .5 INJECTION, POWDER, LYOPHILIZED, FOR SOLUTION INTRAVENOUS; PARENTERAL at 20:30

## 2022-12-30 RX ADMIN — ENOXAPARIN SODIUM 40 MG: 40 INJECTION SUBCUTANEOUS at 16:49

## 2022-12-30 RX ADMIN — DOCUSATE SODIUM 100 MG: 50 LIQUID ORAL at 16:47

## 2022-12-30 RX ADMIN — SODIUM CHLORIDE: 900 INJECTION INTRAVENOUS at 10:17

## 2022-12-30 RX ADMIN — PANTOPRAZOLE SODIUM 40 MG: 40 INJECTION, POWDER, FOR SOLUTION INTRAVENOUS at 16:46

## 2022-12-30 RX ADMIN — PANTOPRAZOLE SODIUM 40 MG: 40 INJECTION, POWDER, FOR SOLUTION INTRAVENOUS at 05:10

## 2022-12-30 RX ADMIN — PIPERACILLIN AND TAZOBACTAM 4.5 G: 4; .5 INJECTION, POWDER, LYOPHILIZED, FOR SOLUTION INTRAVENOUS; PARENTERAL at 05:10

## 2022-12-30 RX ADMIN — OXYCODONE HYDROCHLORIDE 10 MG: 5 TABLET ORAL at 01:01

## 2022-12-30 RX ADMIN — OXYCODONE HYDROCHLORIDE 10 MG: 5 TABLET ORAL at 06:25

## 2022-12-30 RX ADMIN — SENNOSIDES AND DOCUSATE SODIUM 1 TABLET: 50; 8.6 TABLET ORAL at 20:29

## 2022-12-30 RX ADMIN — HYDROMORPHONE HYDROCHLORIDE 0.5 MG: 1 INJECTION, SOLUTION INTRAMUSCULAR; INTRAVENOUS; SUBCUTANEOUS at 13:26

## 2022-12-30 RX ADMIN — PIPERACILLIN AND TAZOBACTAM 4.5 G: 4; .5 INJECTION, POWDER, LYOPHILIZED, FOR SOLUTION INTRAVENOUS; PARENTERAL at 13:05

## 2022-12-30 ASSESSMENT — PAIN DESCRIPTION - PAIN TYPE
TYPE: ACUTE PAIN
TYPE: ACUTE PAIN;SURGICAL PAIN
TYPE: ACUTE PAIN
TYPE: ACUTE PAIN;SURGICAL PAIN
TYPE: ACUTE PAIN
TYPE: ACUTE PAIN;SURGICAL PAIN
TYPE: ACUTE PAIN
TYPE: ACUTE PAIN

## 2022-12-30 NOTE — PROGRESS NOTES
Report received from darrin RN, assumed care at 1900.  Assessment complete.  A&O x 4. Patient calls appropriately.  Patient x1 with FWW  Patient has 8/10 pain. Pain managed with prescribed medications.  Denies N&V. Strict NPO, R nare Ng with tube feed. Impact peptide 10ml/hr  Surgical lap sites x2 dermabond. RUQ JAYLYN drain.  + void, - flatus, - BM.  Patient denies SOB. On 0.5L nasal cannula  Patient calm, pleasant, and cooperative.  Review plan with of care with patient. Call light and personal belongings within reach. Hourly rounding in place. All needs met at this time.

## 2022-12-30 NOTE — PROGRESS NOTES
Postoperative day #2 from a laparoscopic repair of a marginal ulcer  Complains of poor pain control  Benign exam  Mobilizing  Plan:  Increase mobility  Continue NG  Leave drain in for now  Study in 1 day  Discussed with patient and nursing

## 2022-12-30 NOTE — PROGRESS NOTES
RN noticed  was beeping. Checked on patient.  showed heart rate ranging from 70-130s and irregular. RN manually checked pulse and did cardiac assessment. Noted irregular rate. Patient very anxious at this time. Hospitalist notified. Anxiety medication ordered.

## 2022-12-30 NOTE — CARE PLAN
The patient is Stable - Low risk of patient condition declining or worsening    Shift Goals  Clinical Goals: pulmonary hygiene  Patient Goals: Pain control  Family Goals: not present at this time    Progress made toward(s) clinical / shift goals:  IS use in place, educated pt to cough and use pillow to splint. Notified respiratory therapist to round on patient.    Patient is not progressing towards the following goals:

## 2022-12-30 NOTE — CARE PLAN
The patient is Stable - Low risk of patient condition declining or worsening    Shift Goals  Clinical Goals: pain control  Patient Goals: Pain control  Family Goals: not present at this time    Progress made toward(s) clinical / shift goals:  Patient is tolerating continuous tube feed at current rate of 10ml/hr. Patient remains NPO. A humidifier has been added to patient's supplemental oxygen for comfort. Patient continues to tolerate current antibiotic regimen.     Patient is not progressing towards the following goals: Patient has not yet been cleared for discharge.     Problem: Pain - Standard  Goal: Alleviation of pain or a reduction in pain to the patient’s comfort goal  Outcome: Progressing     Problem: Knowledge Deficit - Standard  Goal: Patient and family/care givers will demonstrate understanding of plan of care, disease process/condition, diagnostic tests and medications  Outcome: Progressing

## 2022-12-31 LAB
ANION GAP SERPL CALC-SCNC: 9 MMOL/L (ref 7–16)
BASOPHILS # BLD AUTO: 0.7 % (ref 0–1.8)
BASOPHILS # BLD: 0.06 K/UL (ref 0–0.12)
BUN SERPL-MCNC: 9 MG/DL (ref 8–22)
CALCIUM SERPL-MCNC: 8.5 MG/DL (ref 8.5–10.5)
CHLORIDE SERPL-SCNC: 103 MMOL/L (ref 96–112)
CO2 SERPL-SCNC: 25 MMOL/L (ref 20–33)
CREAT SERPL-MCNC: 0.6 MG/DL (ref 0.5–1.4)
EOSINOPHIL # BLD AUTO: 0.14 K/UL (ref 0–0.51)
EOSINOPHIL NFR BLD: 1.6 % (ref 0–6.9)
ERYTHROCYTE [DISTWIDTH] IN BLOOD BY AUTOMATED COUNT: 49.4 FL (ref 35.9–50)
GFR SERPLBLD CREATININE-BSD FMLA CKD-EPI: 110 ML/MIN/1.73 M 2
GLUCOSE BLD STRIP.AUTO-MCNC: 102 MG/DL (ref 65–99)
GLUCOSE BLD STRIP.AUTO-MCNC: 86 MG/DL (ref 65–99)
GLUCOSE SERPL-MCNC: 94 MG/DL (ref 65–99)
HCT VFR BLD AUTO: 37.6 % (ref 42–52)
HGB BLD-MCNC: 12 G/DL (ref 14–18)
IMM GRANULOCYTES # BLD AUTO: 0.03 K/UL (ref 0–0.11)
IMM GRANULOCYTES NFR BLD AUTO: 0.3 % (ref 0–0.9)
LYMPHOCYTES # BLD AUTO: 1.48 K/UL (ref 1–4.8)
LYMPHOCYTES NFR BLD: 16.6 % (ref 22–41)
MCH RBC QN AUTO: 27.7 PG (ref 27–33)
MCHC RBC AUTO-ENTMCNC: 31.9 G/DL (ref 33.7–35.3)
MCV RBC AUTO: 86.8 FL (ref 81.4–97.8)
MONOCYTES # BLD AUTO: 0.8 K/UL (ref 0–0.85)
MONOCYTES NFR BLD AUTO: 9 % (ref 0–13.4)
NEUTROPHILS # BLD AUTO: 6.41 K/UL (ref 1.82–7.42)
NEUTROPHILS NFR BLD: 71.8 % (ref 44–72)
NRBC # BLD AUTO: 0 K/UL
NRBC BLD-RTO: 0 /100 WBC
PLATELET # BLD AUTO: 251 K/UL (ref 164–446)
PMV BLD AUTO: 9.4 FL (ref 9–12.9)
POTASSIUM SERPL-SCNC: 3.6 MMOL/L (ref 3.6–5.5)
RBC # BLD AUTO: 4.33 M/UL (ref 4.7–6.1)
SODIUM SERPL-SCNC: 137 MMOL/L (ref 135–145)
WBC # BLD AUTO: 8.9 K/UL (ref 4.8–10.8)

## 2022-12-31 PROCEDURE — C9113 INJ PANTOPRAZOLE SODIUM, VIA: HCPCS | Performed by: SURGERY

## 2022-12-31 PROCEDURE — 700102 HCHG RX REV CODE 250 W/ 637 OVERRIDE(OP): Performed by: NURSE PRACTITIONER

## 2022-12-31 PROCEDURE — 80048 BASIC METABOLIC PNL TOTAL CA: CPT

## 2022-12-31 PROCEDURE — 82962 GLUCOSE BLOOD TEST: CPT | Mod: 91

## 2022-12-31 PROCEDURE — 770001 HCHG ROOM/CARE - MED/SURG/GYN PRIV*

## 2022-12-31 PROCEDURE — 99024 POSTOP FOLLOW-UP VISIT: CPT | Performed by: NURSE PRACTITIONER

## 2022-12-31 PROCEDURE — 700105 HCHG RX REV CODE 258: Performed by: SURGERY

## 2022-12-31 PROCEDURE — A9270 NON-COVERED ITEM OR SERVICE: HCPCS | Performed by: NURSE PRACTITIONER

## 2022-12-31 PROCEDURE — 36415 COLL VENOUS BLD VENIPUNCTURE: CPT

## 2022-12-31 PROCEDURE — 85025 COMPLETE CBC W/AUTO DIFF WBC: CPT

## 2022-12-31 PROCEDURE — 700111 HCHG RX REV CODE 636 W/ 250 OVERRIDE (IP): Performed by: SURGERY

## 2022-12-31 RX ORDER — ACETAMINOPHEN 325 MG/1
650 TABLET ORAL EVERY 6 HOURS PRN
Status: DISCONTINUED | OUTPATIENT
Start: 2022-12-31 | End: 2023-01-03 | Stop reason: HOSPADM

## 2022-12-31 RX ADMIN — PIPERACILLIN AND TAZOBACTAM 4.5 G: 4; .5 INJECTION, POWDER, LYOPHILIZED, FOR SOLUTION INTRAVENOUS; PARENTERAL at 13:55

## 2022-12-31 RX ADMIN — PANTOPRAZOLE SODIUM 40 MG: 40 INJECTION, POWDER, FOR SOLUTION INTRAVENOUS at 18:17

## 2022-12-31 RX ADMIN — PIPERACILLIN AND TAZOBACTAM 4.5 G: 4; .5 INJECTION, POWDER, LYOPHILIZED, FOR SOLUTION INTRAVENOUS; PARENTERAL at 05:25

## 2022-12-31 RX ADMIN — SODIUM CHLORIDE: 900 INJECTION INTRAVENOUS at 18:32

## 2022-12-31 RX ADMIN — PANTOPRAZOLE SODIUM 40 MG: 40 INJECTION, POWDER, FOR SOLUTION INTRAVENOUS at 05:18

## 2022-12-31 RX ADMIN — PIPERACILLIN AND TAZOBACTAM 4.5 G: 4; .5 INJECTION, POWDER, LYOPHILIZED, FOR SOLUTION INTRAVENOUS; PARENTERAL at 20:17

## 2022-12-31 RX ADMIN — SODIUM CHLORIDE: 900 INJECTION INTRAVENOUS at 10:56

## 2022-12-31 RX ADMIN — DOCUSATE SODIUM 100 MG: 50 LIQUID ORAL at 05:18

## 2022-12-31 RX ADMIN — OXYCODONE HYDROCHLORIDE 5 MG: 5 TABLET ORAL at 13:56

## 2022-12-31 RX ADMIN — OXYCODONE HYDROCHLORIDE 10 MG: 5 TABLET ORAL at 00:51

## 2022-12-31 RX ADMIN — OXYCODONE HYDROCHLORIDE 5 MG: 5 TABLET ORAL at 18:17

## 2022-12-31 RX ADMIN — DOCUSATE SODIUM 100 MG: 50 LIQUID ORAL at 18:17

## 2022-12-31 RX ADMIN — SODIUM CHLORIDE: 900 INJECTION INTRAVENOUS at 02:59

## 2022-12-31 RX ADMIN — OXYCODONE HYDROCHLORIDE 5 MG: 5 TABLET ORAL at 05:18

## 2022-12-31 RX ADMIN — ENOXAPARIN SODIUM 40 MG: 40 INJECTION SUBCUTANEOUS at 18:17

## 2022-12-31 RX ADMIN — OXYCODONE HYDROCHLORIDE 5 MG: 5 TABLET ORAL at 09:12

## 2022-12-31 RX ADMIN — OXYCODONE HYDROCHLORIDE 5 MG: 5 TABLET ORAL at 21:26

## 2022-12-31 ASSESSMENT — PAIN DESCRIPTION - PAIN TYPE
TYPE: SURGICAL PAIN;ACUTE PAIN
TYPE: SURGICAL PAIN
TYPE: ACUTE PAIN;SURGICAL PAIN
TYPE: ACUTE PAIN;SURGICAL PAIN

## 2022-12-31 NOTE — PROGRESS NOTES
"  DATE: 12/31/2022 12/27 Laparoscopic repair of perforated gastrojejunostomy marginal ulcer with omental patch and external drainage.    INTERVAL EVENTS:  Feeling better, up to chair, denies N/V.    PHYSICAL EXAMINATION:  Vital Signs: BP (!) 153/83   Pulse 89   Temp 37.1 °C (98.8 °F) (Temporal)   Resp 16   Ht 1.829 m (6' 0.01\")   Wt 89.8 kg (197 lb 15.6 oz)   SpO2 93%     NGT with trickle TF in place, tolerating well.  JAYLYN with serosang output.    LABORATORY VALUES:   Recent Labs     12/29/22  0832 12/30/22  0332 12/31/22  0549   WBC 10.4 9.2 8.9   RBC 4.18* 3.89* 4.33*   HEMOGLOBIN 11.4* 10.6* 12.0*   HEMATOCRIT 36.5* 33.9* 37.6*   MCV 87.3 87.1 86.8   MCH 27.3 27.2 27.7   MCHC 31.2* 31.3* 31.9*   RDW 50.0 50.2* 49.4   PLATELETCT 230 209 251   MPV 9.6 9.7 9.4     Recent Labs     12/29/22  0832 12/30/22  0332 12/31/22  0549   SODIUM 140 139 137   POTASSIUM 4.4 3.8 3.6   CHLORIDE 106 106 103   CO2 28 23 25   GLUCOSE 110* 96 94   BUN 16 15 9   CREATININE 0.72 0.70 0.60   CALCIUM 8.4* 8.1* 8.5                IMAGING:   DX-ABDOMEN FOR TUBE PLACEMENT   Final Result      Enteric tube has been placed and the tip projects over the stomach.      DX-ABDOMEN FOR TUBE PLACEMENT   Final Result      Enteric feeding tube position consistent with intragastric placement.      CT-ABDOMEN-PELVIS WITH   Final Result         1. Large amount of free intraperitoneal air, concerning for perforated viscus.      2. The exact perforation is not seen but there is wall thickening and stranding in the gastrojejunostomy area as well at the pylorus, possible areas of perforation.      CRITICAL RESULT READ BACK: Preliminary findings discussed with and critical read back performed by Dr. YOLANDA MORALES in the Emergency Department via telephone on 12/28/2022 8:28 AM            CT-CTA CHEST PULMONARY ARTERY W/ RECONS   Final Result         1.  No evidence of pulmonary embolism.   2.  No acute abnormality within the chest.   3.  Free " intraperitoneal air. See separate abdomen and pelvis CT report for further details.   4.  Atherosclerosis.      Fleischner Society pulmonary nodule recommendations:   Not applicable      DX-CHEST-PORTABLE (1 VIEW)   Final Result      No acute cardiopulmonary disease.          ASSESSMENT AND PLAN:   * Chronic marginal ulcer with perforation (HCC)- (present on admission)  Assessment & Plan  Admission imaging with free intraperitoneal air.  Perforated marginal ulcer (prior Liborio-en-Y gastrojejunostomy).  12/28 Laparoscopic repair of perforated gastrojejunostomy marginal ulcer with omental patch and external drainage.  12/29 Start jejunum trickle TFs.  - NPO, Zosyn.  1/1 Plan for UGI with water soluble contrast.  [facility].    No contraindication to deep vein thrombosis (DVT) prophylaxis- (present on admission)  Assessment & Plan  Prophylactic dose enoxaparin initiated upon admission.           ____________________________________     TRISHA Kaminski    DD: 12/31/2022  8:04 AM

## 2022-12-31 NOTE — RESPIRATORY CARE
PT assessment by request. Pt congested and dealing with pain management. Pt states when his pain is under control, he is able to provide strong cough and clear any secretions/phlegm. Pt also states higher IS values (1000+) with pain issues under control

## 2022-12-31 NOTE — PROGRESS NOTES
Report received from darrin RN, assumed care at 1900.  Assessment complete.  A&O x 4. Patient calls appropriately.  Patient x1 with FWW  Patient has 7/10 pain. Pain managed with prescribed medications.  Denies N&V. Strict NPO, R nare Ng with tube feed. Impact peptide 10ml/hr  Surgical lap sites x2 dermabond. RUQ JAYLYN drain.  + void, + flatus, - BM.  Patient denies SOB. On 2L nasal cannula  Patient calm, pleasant, and cooperative.  Review plan with of care with patient. Call light and personal belongings within reach. Hourly rounding in place. All needs met at this time.

## 2022-12-31 NOTE — PROGRESS NOTES
Bedside report received.  Assessment complete.  A&O x 4. Patient calls appropriately.  Patient ambulates with SB assist.   Patient has 5-6/10 pain. Pain managed with prescribed medications.  Denies N&V. Tolerating tube feed at ordered rate and dose.  Surgical site CDI x2 lap sites with dermabond.  + void, + flatus, - BM.  Patient denies SOB.  SCD's off, pt in chair.  Patient to have upper GI study tomorrow.  Review plan with of care with patient. Call light and personal belongings within reach. Hourly rounding in place. All needs met at this time.

## 2022-12-31 NOTE — CARE PLAN
The patient is Stable - Low risk of patient condition declining or worsening    Shift Goals  Clinical Goals: Pulmonary hygiene, pain control  Patient Goals: Pain control  Family Goals: not present at this time    Progress made toward(s) clinical / shift goals:  Is use in place. Pain medication given so patient can cough. Respiratory therapist now following. Pain medication administered PRN, patient able to sleep and cough after meds    Patient is not progressing towards the following goals:

## 2023-01-01 ENCOUNTER — APPOINTMENT (OUTPATIENT)
Dept: RADIOLOGY | Facility: MEDICAL CENTER | Age: 63
DRG: 326 | End: 2023-01-01
Attending: NURSE PRACTITIONER
Payer: COMMERCIAL

## 2023-01-01 PROBLEM — E87.6 HYPOKALEMIA: Status: ACTIVE | Noted: 2023-01-01

## 2023-01-01 LAB
ANION GAP SERPL CALC-SCNC: 9 MMOL/L (ref 7–16)
BASOPHILS # BLD AUTO: 0.6 % (ref 0–1.8)
BASOPHILS # BLD: 0.04 K/UL (ref 0–0.12)
BUN SERPL-MCNC: 8 MG/DL (ref 8–22)
CALCIUM SERPL-MCNC: 8.5 MG/DL (ref 8.5–10.5)
CHLORIDE SERPL-SCNC: 105 MMOL/L (ref 96–112)
CO2 SERPL-SCNC: 25 MMOL/L (ref 20–33)
CREAT SERPL-MCNC: 0.58 MG/DL (ref 0.5–1.4)
EOSINOPHIL # BLD AUTO: 0.29 K/UL (ref 0–0.51)
EOSINOPHIL NFR BLD: 4.2 % (ref 0–6.9)
ERYTHROCYTE [DISTWIDTH] IN BLOOD BY AUTOMATED COUNT: 46.5 FL (ref 35.9–50)
GFR SERPLBLD CREATININE-BSD FMLA CKD-EPI: 111 ML/MIN/1.73 M 2
GLUCOSE SERPL-MCNC: 89 MG/DL (ref 65–99)
HCT VFR BLD AUTO: 34.3 % (ref 42–52)
HGB BLD-MCNC: 11.1 G/DL (ref 14–18)
IMM GRANULOCYTES # BLD AUTO: 0.03 K/UL (ref 0–0.11)
IMM GRANULOCYTES NFR BLD AUTO: 0.4 % (ref 0–0.9)
LYMPHOCYTES # BLD AUTO: 1.65 K/UL (ref 1–4.8)
LYMPHOCYTES NFR BLD: 24.1 % (ref 22–41)
MCH RBC QN AUTO: 27.5 PG (ref 27–33)
MCHC RBC AUTO-ENTMCNC: 32.4 G/DL (ref 33.7–35.3)
MCV RBC AUTO: 84.9 FL (ref 81.4–97.8)
MONOCYTES # BLD AUTO: 0.63 K/UL (ref 0–0.85)
MONOCYTES NFR BLD AUTO: 9.2 % (ref 0–13.4)
NEUTROPHILS # BLD AUTO: 4.22 K/UL (ref 1.82–7.42)
NEUTROPHILS NFR BLD: 61.5 % (ref 44–72)
NRBC # BLD AUTO: 0 K/UL
NRBC BLD-RTO: 0 /100 WBC
PLATELET # BLD AUTO: 245 K/UL (ref 164–446)
PMV BLD AUTO: 9.6 FL (ref 9–12.9)
POTASSIUM SERPL-SCNC: 3.3 MMOL/L (ref 3.6–5.5)
RBC # BLD AUTO: 4.04 M/UL (ref 4.7–6.1)
SODIUM SERPL-SCNC: 139 MMOL/L (ref 135–145)
WBC # BLD AUTO: 6.9 K/UL (ref 4.8–10.8)

## 2023-01-01 PROCEDURE — 36415 COLL VENOUS BLD VENIPUNCTURE: CPT

## 2023-01-01 PROCEDURE — A9270 NON-COVERED ITEM OR SERVICE: HCPCS | Performed by: SURGERY

## 2023-01-01 PROCEDURE — 85025 COMPLETE CBC W/AUTO DIFF WBC: CPT

## 2023-01-01 PROCEDURE — A9270 NON-COVERED ITEM OR SERVICE: HCPCS | Performed by: NURSE PRACTITIONER

## 2023-01-01 PROCEDURE — 74240 X-RAY XM UPR GI TRC 1CNTRST: CPT

## 2023-01-01 PROCEDURE — 700111 HCHG RX REV CODE 636 W/ 250 OVERRIDE (IP): Performed by: PHYSICIAN ASSISTANT

## 2023-01-01 PROCEDURE — C9113 INJ PANTOPRAZOLE SODIUM, VIA: HCPCS | Performed by: SURGERY

## 2023-01-01 PROCEDURE — 700102 HCHG RX REV CODE 250 W/ 637 OVERRIDE(OP): Performed by: SURGERY

## 2023-01-01 PROCEDURE — 99024 POSTOP FOLLOW-UP VISIT: CPT | Performed by: PHYSICIAN ASSISTANT

## 2023-01-01 PROCEDURE — 700102 HCHG RX REV CODE 250 W/ 637 OVERRIDE(OP): Performed by: NURSE PRACTITIONER

## 2023-01-01 PROCEDURE — 770001 HCHG ROOM/CARE - MED/SURG/GYN PRIV*

## 2023-01-01 PROCEDURE — 700111 HCHG RX REV CODE 636 W/ 250 OVERRIDE (IP): Performed by: SURGERY

## 2023-01-01 PROCEDURE — 700117 HCHG RX CONTRAST REV CODE 255: Performed by: NURSE PRACTITIONER

## 2023-01-01 PROCEDURE — 80048 BASIC METABOLIC PNL TOTAL CA: CPT

## 2023-01-01 PROCEDURE — 700105 HCHG RX REV CODE 258: Performed by: SURGERY

## 2023-01-01 RX ORDER — POTASSIUM CHLORIDE 7.45 MG/ML
10 INJECTION INTRAVENOUS
Status: COMPLETED | OUTPATIENT
Start: 2023-01-01 | End: 2023-01-01

## 2023-01-01 RX ORDER — OXYCODONE HYDROCHLORIDE 5 MG/1
10 TABLET ORAL
Status: DISCONTINUED | OUTPATIENT
Start: 2023-01-01 | End: 2023-01-02

## 2023-01-01 RX ORDER — POLYETHYLENE GLYCOL 3350 17 G/17G
1 POWDER, FOR SOLUTION ORAL 2 TIMES DAILY
Status: DISCONTINUED | OUTPATIENT
Start: 2023-01-01 | End: 2023-01-03 | Stop reason: HOSPADM

## 2023-01-01 RX ORDER — AMOXICILLIN 250 MG
1 CAPSULE ORAL
Status: DISCONTINUED | OUTPATIENT
Start: 2023-01-01 | End: 2023-01-03 | Stop reason: HOSPADM

## 2023-01-01 RX ORDER — AMOXICILLIN 250 MG
1 CAPSULE ORAL NIGHTLY
Status: DISCONTINUED | OUTPATIENT
Start: 2023-01-01 | End: 2023-01-03 | Stop reason: HOSPADM

## 2023-01-01 RX ORDER — HYDROMORPHONE HYDROCHLORIDE 1 MG/ML
0.5 INJECTION, SOLUTION INTRAMUSCULAR; INTRAVENOUS; SUBCUTANEOUS
Status: DISCONTINUED | OUTPATIENT
Start: 2023-01-01 | End: 2023-01-02

## 2023-01-01 RX ORDER — DOCUSATE SODIUM 50 MG/5ML
100 LIQUID ORAL 2 TIMES DAILY
Status: DISCONTINUED | OUTPATIENT
Start: 2023-01-01 | End: 2023-01-03 | Stop reason: HOSPADM

## 2023-01-01 RX ORDER — ONDANSETRON 4 MG/1
4 TABLET, ORALLY DISINTEGRATING ORAL EVERY 4 HOURS PRN
Status: DISCONTINUED | OUTPATIENT
Start: 2023-01-01 | End: 2023-01-03 | Stop reason: HOSPADM

## 2023-01-01 RX ORDER — OXYCODONE HYDROCHLORIDE 5 MG/1
5 TABLET ORAL
Status: DISCONTINUED | OUTPATIENT
Start: 2023-01-01 | End: 2023-01-02

## 2023-01-01 RX ADMIN — SODIUM CHLORIDE: 900 INJECTION INTRAVENOUS at 04:28

## 2023-01-01 RX ADMIN — IOHEXOL 100 ML: 300 INJECTION, SOLUTION INTRAVENOUS at 09:00

## 2023-01-01 RX ADMIN — SODIUM CHLORIDE: 900 INJECTION INTRAVENOUS at 13:52

## 2023-01-01 RX ADMIN — POTASSIUM CHLORIDE 10 MEQ: 7.46 INJECTION, SOLUTION INTRAVENOUS at 13:53

## 2023-01-01 RX ADMIN — DOCUSATE SODIUM 100 MG: 50 LIQUID ORAL at 17:09

## 2023-01-01 RX ADMIN — OXYCODONE HYDROCHLORIDE 5 MG: 5 TABLET ORAL at 04:27

## 2023-01-01 RX ADMIN — POTASSIUM CHLORIDE 10 MEQ: 7.46 INJECTION, SOLUTION INTRAVENOUS at 11:46

## 2023-01-01 RX ADMIN — ENOXAPARIN SODIUM 40 MG: 40 INJECTION SUBCUTANEOUS at 17:09

## 2023-01-01 RX ADMIN — PANTOPRAZOLE SODIUM 40 MG: 40 INJECTION, POWDER, FOR SOLUTION INTRAVENOUS at 04:31

## 2023-01-01 RX ADMIN — SODIUM CHLORIDE: 900 INJECTION INTRAVENOUS at 17:30

## 2023-01-01 RX ADMIN — PANTOPRAZOLE SODIUM 40 MG: 40 INJECTION, POWDER, FOR SOLUTION INTRAVENOUS at 17:09

## 2023-01-01 RX ADMIN — OXYCODONE HYDROCHLORIDE 5 MG: 5 TABLET ORAL at 08:22

## 2023-01-01 RX ADMIN — POLYETHYLENE GLYCOL 3350 1 PACKET: 17 POWDER, FOR SOLUTION ORAL at 18:00

## 2023-01-01 RX ADMIN — PIPERACILLIN AND TAZOBACTAM 4.5 G: 4; .5 INJECTION, POWDER, LYOPHILIZED, FOR SOLUTION INTRAVENOUS; PARENTERAL at 04:29

## 2023-01-01 ASSESSMENT — ENCOUNTER SYMPTOMS
ABDOMINAL PAIN: 1
VOMITING: 0
NAUSEA: 0
CHILLS: 0
FEVER: 0
CONSTIPATION: 1

## 2023-01-01 ASSESSMENT — PAIN DESCRIPTION - PAIN TYPE
TYPE: ACUTE PAIN;SURGICAL PAIN
TYPE: ACUTE PAIN
TYPE: SURGICAL PAIN
TYPE: ACUTE PAIN;SURGICAL PAIN
TYPE: SURGICAL PAIN;ACUTE PAIN

## 2023-01-01 NOTE — PROGRESS NOTES
AA&Ox4. Denies CP/SOB.  Reporting 6/10 pain. Medicated per MAR.   Educated patient regarding pharmacologic and non pharmacologic modalities for pain management.  Skin per flowsheet.  Tolerating tube feeds w/Impact Peptide @ 10ml/hr diet. Denies N/V.  + void. Last BM PTA.  Pt ambulates upself.  All needs met at this time. Call light within reach. Pt calls appropriately. Bed low and locked, non skid socks in place. Hourly rounding in place.

## 2023-01-01 NOTE — CARE PLAN
The patient is Watcher - Medium risk of patient condition declining or worsening    Shift Goals  Clinical Goals: pain mgmt  Patient Goals: pain mgmt  Family Goals: not present at this time    Progress made toward(s) clinical / shift goals:    Problem: Pain - Standard  Goal: Alleviation of pain or a reduction in pain to the patient’s comfort goal  Outcome: Progressing

## 2023-01-01 NOTE — CARE PLAN
The patient is Stable - Low risk of patient condition declining or worsening    Shift Goals  Clinical Goals: pain control, monitor tube feeds  Patient Goals: pain control, rest  Family Goals: not present at this time    Progress made toward(s) clinical / shift goals:  Tolerating tube feeds. Pain managed with prescribed medications. Resting between cares.    Problem: Pain - Standard  Goal: Alleviation of pain or a reduction in pain to the patient’s comfort goal  Outcome: Progressing     Problem: Knowledge Deficit - Standard  Goal: Patient and family/care givers will demonstrate understanding of plan of care, disease process/condition, diagnostic tests and medications  Outcome: Progressing

## 2023-01-01 NOTE — PROGRESS NOTES
"    DATE: 1/1/2023 12/27 Laparoscopic repair of perforated gastrojejunostomy marginal ulcer with omental patch and external drainage.    INTERVAL EVENTS:  Upper GI without evidence of leak. Tolerating tube feeds.   Potassium 3.3. No BM, passing flatus. JAYLYN with scant serosanguinous output.     - Potassium replaced initiated.   - Remove NG tube.   - Initiate full liquid diet.     REVIEW OF SYSTEMS:  Review of Systems   Constitutional:  Negative for chills, fever and malaise/fatigue.   Gastrointestinal:  Positive for abdominal pain and constipation. Negative for nausea and vomiting.   Genitourinary:         Voiding     PHYSICAL EXAMINATION:  Vital Signs: /73   Pulse 82   Temp 37.3 °C (99.1 °F) (Temporal)   Resp 14   Ht 1.829 m (6' 0.01\")   Wt 89.8 kg (197 lb 15.6 oz)   SpO2 94%   Physical Exam  Vitals and nursing note reviewed.   HENT:      Nose:      Comments: NGT in place  Cardiovascular:      Rate and Rhythm: Normal rate.   Pulmonary:      Effort: Pulmonary effort is normal. No respiratory distress.   Abdominal:      General: There is no distension.      Palpations: Abdomen is soft.      Comments: Minimal diffuse tenderness  JAYLYN drain with scant serosang output   Skin:     General: Skin is warm and dry.   Neurological:      Mental Status: He is alert and oriented to person, place, and time.       LABORATORY VALUES:   Recent Labs     12/30/22  0332 12/31/22  0549 01/01/23  0245   WBC 9.2 8.9 6.9   RBC 3.89* 4.33* 4.04*   HEMOGLOBIN 10.6* 12.0* 11.1*   HEMATOCRIT 33.9* 37.6* 34.3*   MCV 87.1 86.8 84.9   MCH 27.2 27.7 27.5   MCHC 31.3* 31.9* 32.4*   RDW 50.2* 49.4 46.5   PLATELETCT 209 251 245   MPV 9.7 9.4 9.6     Recent Labs     12/30/22  0332 12/31/22  0549 01/01/23  0245   SODIUM 139 137 139   POTASSIUM 3.8 3.6 3.3*   CHLORIDE 106 103 105   CO2 23 25 25   GLUCOSE 96 94 89   BUN 15 9 8   CREATININE 0.70 0.60 0.58   CALCIUM 8.1* 8.5 8.5                IMAGING:   DX-UPPER GI-SERIES WITH KUB   Final " Result      No evidence of leak/perforation from the gastrojejunostomy status post repair.      DX-ABDOMEN FOR TUBE PLACEMENT   Final Result      Enteric tube has been placed and the tip projects over the stomach.      DX-ABDOMEN FOR TUBE PLACEMENT   Final Result      Enteric feeding tube position consistent with intragastric placement.      CT-ABDOMEN-PELVIS WITH   Final Result         1. Large amount of free intraperitoneal air, concerning for perforated viscus.      2. The exact perforation is not seen but there is wall thickening and stranding in the gastrojejunostomy area as well at the pylorus, possible areas of perforation.      CRITICAL RESULT READ BACK: Preliminary findings discussed with and critical read back performed by Dr. YOLANDA MORALES in the Emergency Department via telephone on 12/28/2022 8:28 AM            CT-CTA CHEST PULMONARY ARTERY W/ RECONS   Final Result         1.  No evidence of pulmonary embolism.   2.  No acute abnormality within the chest.   3.  Free intraperitoneal air. See separate abdomen and pelvis CT report for further details.   4.  Atherosclerosis.      Fleischner Society pulmonary nodule recommendations:   Not applicable      DX-CHEST-PORTABLE (1 VIEW)   Final Result      No acute cardiopulmonary disease.          Labs reviewed, Medications reviewed and Radiology images reviewed  Duong catheter: No Duong      DVT Prophylaxis: Enoxaparin (Lovenox)  DVT prophylaxis - mechanical: SCDs  Ulcer prophylaxis: Yes    Assessed for rehab: Patient returned to prior level of function, rehabilitation not indicated at this time    ASSESSMENT AND PLAN:   * Chronic marginal ulcer with perforation (HCC)- (present on admission)  Assessment & Plan  Admission imaging with free intraperitoneal air.  Perforated marginal ulcer (prior Liborio-en-Y gastrojejunostomy).  12/28 Laparoscopic repair of perforated gastrojejunostomy marginal ulcer with omental patch and external drainage.  12/29 Start jejunum  trickle TFs. Zosyn.  1/1 Plan for UGI without evidence of leak.   - Remove NGT. Initiate full liqiud diet.   [facility].    Hypokalemia  Assessment & Plan  1/1 Potassium 3.3. Replacement initiated.   Trend laboratory studies.     No contraindication to deep vein thrombosis (DVT) prophylaxis- (present on admission)  Assessment & Plan  Prophylactic dose enoxaparin initiated upon admission.      Discussed patient condition with RN, Patient, and general surgery. Dr. Jaya Murrieta.

## 2023-01-01 NOTE — PROGRESS NOTES
Report received from day shift RN, assumed Care.   Patient is AOx4, responds appropriately.      Pain controlled at this time.  Patient is tolerating tube feeds, denies nausea/vomiting. + flatus.  Up stand by assist with steady gait.    Plan of care discussed, all questions answered.    Educated on use of call light and importance of calling before getting out of bed. Pt verbalizes understanding.    Call light and belongings within reach, treaded slipper socks on, bed in lowest locked position.  All needs met at this time.

## 2023-01-02 LAB
ANION GAP SERPL CALC-SCNC: 11 MMOL/L (ref 7–16)
BACTERIA BLD CULT: NORMAL
BACTERIA BLD CULT: NORMAL
BASOPHILS # BLD AUTO: 0.7 % (ref 0–1.8)
BASOPHILS # BLD: 0.04 K/UL (ref 0–0.12)
BUN SERPL-MCNC: 7 MG/DL (ref 8–22)
CALCIUM SERPL-MCNC: 8.4 MG/DL (ref 8.5–10.5)
CHLORIDE SERPL-SCNC: 106 MMOL/L (ref 96–112)
CO2 SERPL-SCNC: 24 MMOL/L (ref 20–33)
CREAT SERPL-MCNC: 0.56 MG/DL (ref 0.5–1.4)
EOSINOPHIL # BLD AUTO: 0.37 K/UL (ref 0–0.51)
EOSINOPHIL NFR BLD: 6.3 % (ref 0–6.9)
ERYTHROCYTE [DISTWIDTH] IN BLOOD BY AUTOMATED COUNT: 46 FL (ref 35.9–50)
GFR SERPLBLD CREATININE-BSD FMLA CKD-EPI: 112 ML/MIN/1.73 M 2
GLUCOSE SERPL-MCNC: 87 MG/DL (ref 65–99)
HCT VFR BLD AUTO: 32.4 % (ref 42–52)
HGB BLD-MCNC: 10.3 G/DL (ref 14–18)
IMM GRANULOCYTES # BLD AUTO: 0.03 K/UL (ref 0–0.11)
IMM GRANULOCYTES NFR BLD AUTO: 0.5 % (ref 0–0.9)
LYMPHOCYTES # BLD AUTO: 1.29 K/UL (ref 1–4.8)
LYMPHOCYTES NFR BLD: 22.1 % (ref 22–41)
MAGNESIUM SERPL-MCNC: 1.5 MG/DL (ref 1.5–2.5)
MCH RBC QN AUTO: 27 PG (ref 27–33)
MCHC RBC AUTO-ENTMCNC: 31.8 G/DL (ref 33.7–35.3)
MCV RBC AUTO: 84.8 FL (ref 81.4–97.8)
MONOCYTES # BLD AUTO: 0.79 K/UL (ref 0–0.85)
MONOCYTES NFR BLD AUTO: 13.5 % (ref 0–13.4)
NEUTROPHILS # BLD AUTO: 3.33 K/UL (ref 1.82–7.42)
NEUTROPHILS NFR BLD: 56.9 % (ref 44–72)
NRBC # BLD AUTO: 0.02 K/UL
NRBC BLD-RTO: 0.3 /100 WBC
PHOSPHATE SERPL-MCNC: 2.8 MG/DL (ref 2.5–4.5)
PLATELET # BLD AUTO: 241 K/UL (ref 164–446)
PMV BLD AUTO: 9.5 FL (ref 9–12.9)
POTASSIUM SERPL-SCNC: 3.4 MMOL/L (ref 3.6–5.5)
RBC # BLD AUTO: 3.82 M/UL (ref 4.7–6.1)
SIGNIFICANT IND 70042: NORMAL
SIGNIFICANT IND 70042: NORMAL
SITE SITE: NORMAL
SITE SITE: NORMAL
SODIUM SERPL-SCNC: 141 MMOL/L (ref 135–145)
SOURCE SOURCE: NORMAL
SOURCE SOURCE: NORMAL
WBC # BLD AUTO: 5.9 K/UL (ref 4.8–10.8)

## 2023-01-02 PROCEDURE — A9270 NON-COVERED ITEM OR SERVICE: HCPCS | Performed by: SURGERY

## 2023-01-02 PROCEDURE — 700111 HCHG RX REV CODE 636 W/ 250 OVERRIDE (IP): Performed by: NURSE PRACTITIONER

## 2023-01-02 PROCEDURE — 770001 HCHG ROOM/CARE - MED/SURG/GYN PRIV*

## 2023-01-02 PROCEDURE — 99024 POSTOP FOLLOW-UP VISIT: CPT | Performed by: NURSE PRACTITIONER

## 2023-01-02 PROCEDURE — 700111 HCHG RX REV CODE 636 W/ 250 OVERRIDE (IP): Performed by: SURGERY

## 2023-01-02 PROCEDURE — A9270 NON-COVERED ITEM OR SERVICE: HCPCS | Performed by: NURSE PRACTITIONER

## 2023-01-02 PROCEDURE — 85025 COMPLETE CBC W/AUTO DIFF WBC: CPT

## 2023-01-02 PROCEDURE — 84100 ASSAY OF PHOSPHORUS: CPT

## 2023-01-02 PROCEDURE — 83735 ASSAY OF MAGNESIUM: CPT

## 2023-01-02 PROCEDURE — 700102 HCHG RX REV CODE 250 W/ 637 OVERRIDE(OP): Performed by: SURGERY

## 2023-01-02 PROCEDURE — 700102 HCHG RX REV CODE 250 W/ 637 OVERRIDE(OP): Performed by: NURSE PRACTITIONER

## 2023-01-02 PROCEDURE — 80048 BASIC METABOLIC PNL TOTAL CA: CPT

## 2023-01-02 PROCEDURE — C9113 INJ PANTOPRAZOLE SODIUM, VIA: HCPCS | Performed by: SURGERY

## 2023-01-02 RX ORDER — MAGNESIUM SULFATE HEPTAHYDRATE 40 MG/ML
2 INJECTION, SOLUTION INTRAVENOUS ONCE
Status: COMPLETED | OUTPATIENT
Start: 2023-01-02 | End: 2023-01-02

## 2023-01-02 RX ORDER — OXYCODONE HYDROCHLORIDE 5 MG/1
5 TABLET ORAL EVERY 4 HOURS PRN
Status: DISCONTINUED | OUTPATIENT
Start: 2023-01-02 | End: 2023-01-03 | Stop reason: HOSPADM

## 2023-01-02 RX ORDER — OXYCODONE HYDROCHLORIDE 5 MG/1
2.5 TABLET ORAL EVERY 4 HOURS PRN
Status: DISCONTINUED | OUTPATIENT
Start: 2023-01-02 | End: 2023-01-03 | Stop reason: HOSPADM

## 2023-01-02 RX ORDER — OMEPRAZOLE 20 MG/1
40 CAPSULE, DELAYED RELEASE ORAL 2 TIMES DAILY
Status: DISCONTINUED | OUTPATIENT
Start: 2023-01-02 | End: 2023-01-03 | Stop reason: HOSPADM

## 2023-01-02 RX ORDER — POTASSIUM CHLORIDE 7.45 MG/ML
10 INJECTION INTRAVENOUS
Status: COMPLETED | OUTPATIENT
Start: 2023-01-02 | End: 2023-01-02

## 2023-01-02 RX ADMIN — OMEPRAZOLE 40 MG: 20 CAPSULE, DELAYED RELEASE ORAL at 10:17

## 2023-01-02 RX ADMIN — POTASSIUM CHLORIDE 10 MEQ: 7.46 INJECTION, SOLUTION INTRAVENOUS at 12:59

## 2023-01-02 RX ADMIN — OXYCODONE HYDROCHLORIDE 5 MG: 5 TABLET ORAL at 03:39

## 2023-01-02 RX ADMIN — POTASSIUM CHLORIDE 10 MEQ: 7.46 INJECTION, SOLUTION INTRAVENOUS at 10:33

## 2023-01-02 RX ADMIN — MAGNESIUM SULFATE HEPTAHYDRATE 2 G: 40 INJECTION, SOLUTION INTRAVENOUS at 17:11

## 2023-01-02 RX ADMIN — ENOXAPARIN SODIUM 40 MG: 40 INJECTION SUBCUTANEOUS at 17:06

## 2023-01-02 RX ADMIN — POTASSIUM CHLORIDE 10 MEQ: 7.46 INJECTION, SOLUTION INTRAVENOUS at 07:41

## 2023-01-02 RX ADMIN — OXYCODONE HYDROCHLORIDE 2.5 MG: 5 TABLET ORAL at 17:17

## 2023-01-02 RX ADMIN — PANTOPRAZOLE SODIUM 40 MG: 40 INJECTION, POWDER, FOR SOLUTION INTRAVENOUS at 04:09

## 2023-01-02 RX ADMIN — OMEPRAZOLE 40 MG: 20 CAPSULE, DELAYED RELEASE ORAL at 17:02

## 2023-01-02 RX ADMIN — POTASSIUM CHLORIDE 10 MEQ: 7.46 INJECTION, SOLUTION INTRAVENOUS at 11:50

## 2023-01-02 ASSESSMENT — PAIN DESCRIPTION - PAIN TYPE
TYPE: ACUTE PAIN

## 2023-01-02 NOTE — PROGRESS NOTES
AA&Ox4. Denies CP/SOB.  No reports of pain.   Educated patient regarding pharmacologic and non pharmacologic modalities for pain management.  Skin per flowsheet.  Tolerating full liquid diet. Denies N/V.  + void. Last BM 1/2.  Pt ambulates upself.  All needs met at this time. Call light within reach. Pt calls appropriately. Bed low and locked, non skid socks in place. Hourly rounding in place.

## 2023-01-02 NOTE — PROGRESS NOTES
Report received from day shift RN, assumed Care.   Patient is AOx4, responds appropriately.      Pain controlled at this time.  Patient is tolerating full liquid diet, denies nausea/vomiting. + flatus. JAYLYN drain to RLQ.  Up self with steady gait.    Plan of care discussed, all questions answered.    Educated on use of call light and importance of calling when assistance is required. Pt verbalizes understanding.    Call light and belongings within reach, treaded slipper socks on, bed in lowest locked position.  All needs met at this time.

## 2023-01-02 NOTE — PROGRESS NOTES
"  DATE: 1/2/2023    HD # 5 Perforated marginal ulcer.    12/27 Laparoscopic repair of perforated gastrojejunostomy marginal ulcer with omental patch and external drainage.    INTERVAL EVENTS:  Sleeping, awakens easily, doing well, tolerating oral diet, multiple BMs.  Electrolytes noted.    PHYSICAL EXAMINATION:  Vital Signs: BP (!) 142/76   Pulse 79   Temp 37.1 °C (98.8 °F) (Temporal)   Resp 16   Ht 1.829 m (6' 0.01\")   Wt 89.8 kg (197 lb 15.6 oz)   SpO2 92%     Abdomen soft, RLQ JAYLYN with serous output, lap sites c/d/i with skin glue in place.    LABORATORY VALUES:   Recent Labs     12/31/22  0549 01/01/23  0245 01/02/23 0339   WBC 8.9 6.9 5.9   RBC 4.33* 4.04* 3.82*   HEMOGLOBIN 12.0* 11.1* 10.3*   HEMATOCRIT 37.6* 34.3* 32.4*   MCV 86.8 84.9 84.8   MCH 27.7 27.5 27.0   MCHC 31.9* 32.4* 31.8*   RDW 49.4 46.5 46.0   PLATELETCT 251 245 241   MPV 9.4 9.6 9.5     Recent Labs     12/31/22 0549 01/01/23  0245 01/02/23 0339   SODIUM 137 139 141   POTASSIUM 3.6 3.3* 3.4*   CHLORIDE 103 105 106   CO2 25 25 24   GLUCOSE 94 89 87   BUN 9 8 7*   CREATININE 0.60 0.58 0.56   CALCIUM 8.5 8.5 8.4*                IMAGING:   DX-UPPER GI-SERIES WITH KUB   Final Result      No evidence of leak/perforation from the gastrojejunostomy status post repair.      DX-ABDOMEN FOR TUBE PLACEMENT   Final Result      Enteric tube has been placed and the tip projects over the stomach.      DX-ABDOMEN FOR TUBE PLACEMENT   Final Result      Enteric feeding tube position consistent with intragastric placement.      CT-ABDOMEN-PELVIS WITH   Final Result         1. Large amount of free intraperitoneal air, concerning for perforated viscus.      2. The exact perforation is not seen but there is wall thickening and stranding in the gastrojejunostomy area as well at the pylorus, possible areas of perforation.      CRITICAL RESULT READ BACK: Preliminary findings discussed with and critical read back performed by Dr. YOLANDA MORALES in the " Emergency Department via telephone on 12/28/2022 8:28 AM            CT-CTA CHEST PULMONARY ARTERY W/ RECONS   Final Result         1.  No evidence of pulmonary embolism.   2.  No acute abnormality within the chest.   3.  Free intraperitoneal air. See separate abdomen and pelvis CT report for further details.   4.  Atherosclerosis.      Fleischner Society pulmonary nodule recommendations:   Not applicable      DX-CHEST-PORTABLE (1 VIEW)   Final Result      No acute cardiopulmonary disease.          ASSESSMENT AND PLAN:   * Chronic marginal ulcer with perforation (HCC)- (present on admission)  Assessment & Plan  Admission imaging with free intraperitoneal air.  Perforated marginal ulcer (prior Liborio-en-Y gastrojejunostomy).  12/28 Laparoscopic repair of perforated gastrojejunostomy marginal ulcer with omental patch and external drainage.  12/29 Start jejunum trickle TFs. Zosyn.  1/1 Plan for UGI without evidence of leak. Remove NGT. Initiate full liqiud diet.  - 4 day course Zosyn completed.  - Continue omeprazole BID for 4 weeks, then once daily.  - Plan to remove JAYLYN prior to discharge.  [facility].    Hypokalemia  Assessment & Plan  1/1 Replete and trend.  1/2 Replete and trend, empiric mag replacement.    No contraindication to deep vein thrombosis (DVT) prophylaxis- (present on admission)  Assessment & Plan  Prophylactic dose enoxaparin initiated upon admission.      - Wean oxygen  - Anticipate discharge home next 24-48 hrs     ____________________________________     TRISHA Kaminski    DD: 1/2/2023  7:19 AM

## 2023-01-02 NOTE — DIETARY
Nutrition Services: Brief Update  Day 5 of admit.  Kris Lewis is a 62 y.o. male with admitting DX of Chronic marginal ulcer with perforation     Pt assessed for trickle tube feeding 12/29.  NGT removed 1/1. Tube feeding order discontinued.  Diet advanced to full liquid, then Regular.  Per ADL flow sheet, PO has been >50% for one meal thus far.    Problem: Nutritional:  Goal: Achieve adequate nutritional intake  Description: Patient will consume 50% of meals  Outcome: Progressing    RD will follow per dept guidelines.

## 2023-01-02 NOTE — CARE PLAN
The patient is Stable - Low risk of patient condition declining or worsening    Shift Goals  Clinical Goals: Upper GI series; pain management  Patient Goals: NG tube out; pain management  Family Goals: not present at this time    Progress made toward(s) clinical / shift goals:  Upper GI series complete. Pain managed with PRN meds.    Patient is not progressing towards the following goals:

## 2023-01-02 NOTE — CARE PLAN
The patient is Watcher - Medium risk of patient condition declining or worsening    Shift Goals  Clinical Goals: bowel movement  Patient Goals: bowel movement; d/c  Family Goals: not present at this time    Progress made toward(s) clinical / shift goals:    Problem: Pain - Standard  Goal: Alleviation of pain or a reduction in pain to the patient’s comfort goal  Outcome: Progressing

## 2023-01-02 NOTE — CARE PLAN
The patient is Stable - Low risk of patient condition declining or worsening    Shift Goals  Clinical Goals: wean off O2  Patient Goals: wean off O2; mobility  Family Goals: not present at this time    Progress made toward(s) clinical / shift goals:  Patient using IS + ambulating to wean off O2    Patient is not progressing towards the following goals:

## 2023-01-03 ENCOUNTER — PHARMACY VISIT (OUTPATIENT)
Dept: PHARMACY | Facility: MEDICAL CENTER | Age: 63
End: 2023-01-03
Payer: COMMERCIAL

## 2023-01-03 ENCOUNTER — TELEMEDICINE (OUTPATIENT)
Dept: MEDICAL GROUP | Facility: PHYSICIAN GROUP | Age: 63
End: 2023-01-03

## 2023-01-03 VITALS
BODY MASS INDEX: 26.81 KG/M2 | RESPIRATION RATE: 18 BRPM | OXYGEN SATURATION: 95 % | DIASTOLIC BLOOD PRESSURE: 81 MMHG | TEMPERATURE: 98.8 F | WEIGHT: 197.97 LBS | SYSTOLIC BLOOD PRESSURE: 141 MMHG | HEART RATE: 57 BPM | HEIGHT: 72 IN

## 2023-01-03 VITALS — BODY MASS INDEX: 25.06 KG/M2 | HEIGHT: 72 IN | WEIGHT: 185 LBS

## 2023-01-03 DIAGNOSIS — K28.5 CHRONIC MARGINAL ULCER WITH PERFORATION (HCC): ICD-10-CM

## 2023-01-03 PROBLEM — Z78.9 NO CONTRAINDICATION TO DEEP VEIN THROMBOSIS (DVT) PROPHYLAXIS: Status: RESOLVED | Noted: 2022-12-29 | Resolved: 2023-01-03

## 2023-01-03 PROBLEM — E87.6 HYPOKALEMIA: Status: RESOLVED | Noted: 2023-01-01 | Resolved: 2023-01-03

## 2023-01-03 LAB
ANION GAP SERPL CALC-SCNC: 8 MMOL/L (ref 7–16)
BASOPHILS # BLD AUTO: 1 % (ref 0–1.8)
BASOPHILS # BLD: 0.06 K/UL (ref 0–0.12)
BUN SERPL-MCNC: 7 MG/DL (ref 8–22)
CALCIUM SERPL-MCNC: 8 MG/DL (ref 8.5–10.5)
CHLORIDE SERPL-SCNC: 107 MMOL/L (ref 96–112)
CO2 SERPL-SCNC: 26 MMOL/L (ref 20–33)
CREAT SERPL-MCNC: 0.57 MG/DL (ref 0.5–1.4)
EOSINOPHIL # BLD AUTO: 0.36 K/UL (ref 0–0.51)
EOSINOPHIL NFR BLD: 5.8 % (ref 0–6.9)
ERYTHROCYTE [DISTWIDTH] IN BLOOD BY AUTOMATED COUNT: 47 FL (ref 35.9–50)
GFR SERPLBLD CREATININE-BSD FMLA CKD-EPI: 111 ML/MIN/1.73 M 2
GLUCOSE SERPL-MCNC: 88 MG/DL (ref 65–99)
HCT VFR BLD AUTO: 31.2 % (ref 42–52)
HGB BLD-MCNC: 10 G/DL (ref 14–18)
IMM GRANULOCYTES # BLD AUTO: 0.02 K/UL (ref 0–0.11)
IMM GRANULOCYTES NFR BLD AUTO: 0.3 % (ref 0–0.9)
LYMPHOCYTES # BLD AUTO: 1.68 K/UL (ref 1–4.8)
LYMPHOCYTES NFR BLD: 27 % (ref 22–41)
MAGNESIUM SERPL-MCNC: 1.7 MG/DL (ref 1.5–2.5)
MCH RBC QN AUTO: 27.2 PG (ref 27–33)
MCHC RBC AUTO-ENTMCNC: 32.1 G/DL (ref 33.7–35.3)
MCV RBC AUTO: 85 FL (ref 81.4–97.8)
MONOCYTES # BLD AUTO: 0.78 K/UL (ref 0–0.85)
MONOCYTES NFR BLD AUTO: 12.5 % (ref 0–13.4)
NEUTROPHILS # BLD AUTO: 3.32 K/UL (ref 1.82–7.42)
NEUTROPHILS NFR BLD: 53.4 % (ref 44–72)
NRBC # BLD AUTO: 0 K/UL
NRBC BLD-RTO: 0 /100 WBC
PLATELET # BLD AUTO: 255 K/UL (ref 164–446)
PMV BLD AUTO: 9.6 FL (ref 9–12.9)
POTASSIUM SERPL-SCNC: 3.8 MMOL/L (ref 3.6–5.5)
RBC # BLD AUTO: 3.67 M/UL (ref 4.7–6.1)
SODIUM SERPL-SCNC: 141 MMOL/L (ref 135–145)
WBC # BLD AUTO: 6.2 K/UL (ref 4.8–10.8)

## 2023-01-03 PROCEDURE — 83735 ASSAY OF MAGNESIUM: CPT

## 2023-01-03 PROCEDURE — A9270 NON-COVERED ITEM OR SERVICE: HCPCS | Performed by: NURSE PRACTITIONER

## 2023-01-03 PROCEDURE — RXMED WILLOW AMBULATORY MEDICATION CHARGE: Performed by: NURSE PRACTITIONER

## 2023-01-03 PROCEDURE — 94618 PULMONARY STRESS TESTING: CPT

## 2023-01-03 PROCEDURE — 99213 OFFICE O/P EST LOW 20 MIN: CPT | Mod: 95 | Performed by: FAMILY MEDICINE

## 2023-01-03 PROCEDURE — 700102 HCHG RX REV CODE 250 W/ 637 OVERRIDE(OP): Performed by: NURSE PRACTITIONER

## 2023-01-03 PROCEDURE — 85025 COMPLETE CBC W/AUTO DIFF WBC: CPT

## 2023-01-03 PROCEDURE — 99239 HOSP IP/OBS DSCHRG MGMT >30: CPT | Mod: 24 | Performed by: NURSE PRACTITIONER

## 2023-01-03 PROCEDURE — 80048 BASIC METABOLIC PNL TOTAL CA: CPT

## 2023-01-03 RX ORDER — OMEPRAZOLE 40 MG/1
CAPSULE, DELAYED RELEASE ORAL
Qty: 72 CAPSULE | Refills: 0 | Status: SHIPPED | OUTPATIENT
Start: 2023-01-03 | End: 2023-02-23

## 2023-01-03 RX ORDER — CELECOXIB 200 MG/1
CAPSULE ORAL
COMMUNITY
Start: 2022-11-23

## 2023-01-03 RX ORDER — OXYCODONE HYDROCHLORIDE 5 MG/1
TABLET ORAL
COMMUNITY
Start: 2022-11-23

## 2023-01-03 RX ORDER — ACETAMINOPHEN 325 MG/1
650 TABLET ORAL
COMMUNITY
Start: 2023-01-03

## 2023-01-03 RX ADMIN — OMEPRAZOLE 40 MG: 20 CAPSULE, DELAYED RELEASE ORAL at 04:47

## 2023-01-03 RX ADMIN — OXYCODONE 5 MG: 5 TABLET ORAL at 07:50

## 2023-01-03 ASSESSMENT — PATIENT HEALTH QUESTIONNAIRE - PHQ9: CLINICAL INTERPRETATION OF PHQ2 SCORE: 0

## 2023-01-03 ASSESSMENT — FIBROSIS 4 INDEX: FIB4 SCORE: 0.98

## 2023-01-03 NOTE — DISCHARGE INSTRUCTIONS
Post Operative Discharge Instructions:    1. DIET: Upon discharge from the hospital you may resume your normal preoperative diet. Depending on how you are feeling and whether you have nausea or not, you may wish to stay with a bland diet for the first few days. However, you can advance this as quickly as you feel ready.    2. ACTIVITIES: After discharge from the hospital, you may resume full routine activities. However, there should be no heavy lifting (greater than 10 pounds) and no strenuous activities until after your follow-up visit. Otherwise, routine activities of daily living are acceptable.    3. DRIVING: You may drive whenever you are off pain medications and are able to perform the activities needed to drive, i.e. turning, bending, twisting, etc.    4. BATHING: You may get the wound wet at any time after leaving the hospital. You may shower, but do not submerge in a bath for at least two weeks. If you have wound dressings, they may come off after 48 hours. If you have skin glue to the wound, this will fall off on its own, do not pick at it. If you have steri strips to the wound, these will fall off on their own, do not pick at them, may trim the edges if needed.    5. BOWEL FUNCTION: A few patients, after this operation, will develop either frequent or loose stools after meals. This usually corrects itself after a few days, to a few weeks. If this occurs, do not worry; it is not unusual and will resolve. Much more common than loose stools, is constipation. The combination of pain medication and decreased activity level can cause constipation in otherwise normal patients. If you feel this is occurring, take a laxative (Milk of Magnesia, Ex-Lax, Senokot, etc.) until the problem has resolved.    6. PAIN MEDICATION: You will be given a prescription for pain medication at discharge. Please take these as directed. It is important to remember not to take medications on an empty stomach as this may cause  nausea. You may also take over the counter acetaminophen and/or NSAIDS (ibuprofen, Aleve, Advil, Motrin) per the package instructions.     7.CALL IF YOU HAVE: (1) Fevers to more than 101F, (2) Unusual chest or leg pain, (3) Drainage or fluid from incision that may be foul smelling, increased tenderness or soreness at the wound or the wound edges are no longer together, redness or swelling at the incision site. Please do not hesitate to call with any other questions.

## 2023-01-03 NOTE — PROGRESS NOTES
Discharge Lounge order placed and patient educated. Care plan and patient education completed. All belongings returned to patient. Medication delivered to bedside. Patient transported via wheelchair to discharge lounge. Family instructed to pickup patient at [facility].

## 2023-01-03 NOTE — PROGRESS NOTES
AA&Ox4. Denies CP/SOB.  Reporting 8/10 pain. Medicated per MAR.   Educated patient regarding pharmacologic and non pharmacologic modalities for pain management.  Skin per flowsheet.  Tolerating regular diet. Denies N/V.  + void. Last BM 1/3.  Pt ambulates upself.  All needs met at this time. Call light within reach. Pt calls appropriately. Bed low and locked, non skid socks in place. Hourly rounding in place.

## 2023-01-03 NOTE — DISCHARGE PLANNING
Case Management Discharge Planning    Admission Date: 12/28/2022  GMLOS: 9.4  ALOS: 6    6-Clicks ADL Score: 18  6-Clicks Mobility Score: 18      Anticipated Discharge Dispo: Discharge Disposition: Discharged to home/self care (01)  Discharge Address: [address] [place] [address] [address],  [place] [address]  Discharge Contact Phone Number: [phone]    DME Needed: No    Action(s) Taken: Transport Arranged     LSW provided ALAN Hanna with a taxi voucher to assist pt with transportation home.     Medically Clear: Yes

## 2023-01-03 NOTE — PROGRESS NOTES
Received report from previous shift RN. Assumed care of patient at 1900.  Assessment complete.  Patient A&O x 4. Patient calls appropriately.  Patient ambulates with no assist.   Patient has 0/10 pain.    Denies N&V. Tolerating regular diet.  RUQ JAYLYN drain in place to bulb suction.   + void, + flatus, 1/2 BM.  Patient on RA, denies SOB.  SCD's on.     Reviewed plan of care with patient. Call light and personal belongings within reach. Hourly rounding in place. All needs met at this time.

## 2023-01-03 NOTE — CARE PLAN
The patient is Stable - Low risk of patient condition declining or worsening    Shift Goals  Clinical Goals: drain management  Patient Goals: rest  Family Goals: not present at this time    Progress made toward(s) clinical / shift goals:  JAYLYN bulb to suction.     Problem: Pain - Standard  Goal: Alleviation of pain or a reduction in pain to the patient’s comfort goal  Outcome: Progressing     Problem: Knowledge Deficit - Standard  Goal: Patient and family/care givers will demonstrate understanding of plan of care, disease process/condition, diagnostic tests and medications  Outcome: Progressing       Patient is not progressing towards the following goals:

## 2023-01-03 NOTE — DISCHARGE SUMMARY
Discharge Summary    DATE OF ADMISSION: 12/28/2022    DATE OF DISCHARGE: 1/3/2023    DISCHARGE DIAGNOSIS:  Perforated marginal ulcer.    CONSULTATIONS:  none    PROCEDURES:   Laparoscopic repair of perforated gastrojejunostomy marginal ulcer with omental patch and external drainage on 12/28/2022 by Dr. Pierce See.    BRIEF HPI and HOSPITAL COURSE:  Admitted with above, see admission history and physical. Underwent procedure as above. On day of discharge, the patient has stable vital signs, on room air, tolerating an oral diet, and pain is well controlled without narcotic pain meds. The patient is stable for discharge to home and is to follow up with his primary care provider and/or previous surgeon.    DISPOSITION: Discharged home on 1/3/2023. The patient was counseled and questions were answered. Specifically, signs and symptoms of infection, respiratory decompensation, and persistent or worsening pain were discussed and the patient agrees to seek medical attention if any of these develop. He has been advised to avoid all NSAIDs and to quit smoking.    DISCHARGE MEDICATIONS:     Medication List        START taking these medications        Instructions   acetaminophen 325 MG Tabs  Commonly known as: Tylenol   2 Tablets.  Dose: 650 mg     omeprazole 40 MG delayed-release capsule  Start taking on: January 3, 2023  Commonly known as: PRILOSEC   Take 1 Capsule by mouth 2 times a day for 21 days, THEN 1 Capsule every day for 30 days.            STOP taking these medications      ibuprofen 200 MG Tabs  Commonly known as: MOTRIN              ACTIVITY:  No strenuous exercise or heavy lifting (more than 10 lbs) for two weeks post op.      WOUND CARE:  You may shower, but do not submerge in a bath for at least two weeks. If you have wound dressings, they may come off after 48 hours. If you have skin glue to the wound, this will fall off on its own, do not pick at it. If you have steri strips to the wound, these will fall  off on their own, do not pick at them, may trim the edges if needed.      DIET:  Orders Placed This Encounter   Procedures    Diet Order Diet: Regular     Standing Status:   Standing     Number of Occurrences:   1     Order Specific Question:   Diet:     Answer:   Regular [1]       FOLLOW UP:  [facility]  [id number]  [facility] [address]  [phone]    Follow up  As needed in the ACS clinic    Dima Stevens M.D.  [address] [facility] [address]  [facility] [id number] [phone]    Schedule an appointment as soon as possible for a visit        TIME SPENT ON DISCHARGE: 35 minutes      ____________________________________________  TRISHA Kaminski    DD: 1/3/2023 07:05 AM

## 2023-01-03 NOTE — PROGRESS NOTES
Meds-to-Beds: Discharge prescription orders listed below delivered to patient's bedside. ALAN Oliveros notified. Patient counseled.      Current Outpatient Medications   Medication Sig Dispense Refill    omeprazole (PRILOSEC) 40 MG delayed-release capsule Take 1 Capsule by mouth 2 times a day for 21 days, THEN 1 Capsule every day for 30 days. 72 Capsule 0      Nick Vick, Pharmacy Intern

## 2023-01-04 NOTE — PROGRESS NOTES
Virtual Visit: Established Patient   This visit was conducted via Zoom using secure and encrypted videoconferencing technology.   The patient was in their home in the [facility].    The patient's identity was confirmed and verbal consent was obtained for this virtual visit.    Subjective:   CC:   Chief Complaint   Patient presents with    Follow-Up     [facility]     Kris Lewis is a 62 y.o. male presenting for evaluation and management of:    1. Chronic marginal ulcer with perforation (HCC)  Recent admit and surgery. Now back at home and doing well with good appetite and diet and BM. Will call wsg tomorrow for f/u appt and with me in 4 weeks    Past Medical History:   Diagnosis Date    Arthritis     Right knee    Dental disorder     full set    Right knee pain 2022     Past Surgical History:   Procedure Laterality Date    OH LAP,DIAGNOSTIC ABDOMEN N/A 2022    Procedure: Laparoscopic repair of perforated gastrojejunostomy marginal ulcer with omental patch;  Surgeon: Pierce See M.D.;  Location: SURGERY [facility];  Service: General    PB KNEE SCOPE,SINGLE MENISECTOMY Right 2022    Procedure: MENISCECTOMY, KNEE, ARTHROSCOPIC;  Surgeon: Jordan Lilly M.D.;  Location: SURGERY [facility];  Service: Orthopedics    LOOSE BODY REMOVAL Right 2022    Procedure: ARTHROSCOPIC VERSUS OPEN REMOVAL OF LOOSE BODIES RIGHT KNEE, ARTHROSCOPIC MEDIAL AND LATERAL MENISCECTOMY;  Surgeon: Jordan Lilyl M.D.;  Location: SURGERY [facility];  Service: Orthopedics    GASTRIC BYPASS LAPAROSCOPIC      KNEE ARTHROSCOPY Left        Social History     Tobacco Use    Smoking status: Every Day     Packs/day: 0.50     Years: 10.00     Pack years: 5.00     Types: Cigarettes     Last attempt to quit: 2000     Years since quittin.4    Smokeless tobacco: Never   Vaping Use    Vaping Use: Never used   Substance Use Topics    Alcohol use: No    Drug use: Not Currently       Family History   Problem  Relation Age of Onset    Cancer Father          Current Outpatient Medications:     acetaminophen (TYLENOL) 325 MG Tab, 2 Tablets., Disp: , Rfl:     omeprazole (PRILOSEC) 40 MG delayed-release capsule, Take 1 Capsule by mouth 2 times a day for 21 days, THEN 1 Capsule every day for 30 days., Disp: 72 Capsule, Rfl: 0    celecoxib (CELEBREX) 200 MG Cap, , Disp: , Rfl:     oxyCODONE immediate-release (ROXICODONE) 5 MG Tab, , Disp: , Rfl:     Patient was instructed on the use of medications, either prescriptions or OTC and informed on when the appropriate follow up time period should be. In addition, patient was also instructed that should any acute worsening occur that they should notify this clinic asap or call 911.        ROS       Current medicines (including changes today)  Current Outpatient Medications   Medication Sig Dispense Refill    acetaminophen (TYLENOL) 325 MG Tab 2 Tablets.      omeprazole (PRILOSEC) 40 MG delayed-release capsule Take 1 Capsule by mouth 2 times a day for 21 days, THEN 1 Capsule every day for 30 days. 72 Capsule 0    celecoxib (CELEBREX) 200 MG Cap       oxyCODONE immediate-release (ROXICODONE) 5 MG Tab        No current facility-administered medications for this visit.       Patient Active Problem List    Diagnosis Date Noted    Chronic marginal ulcer with perforation (HCC) 12/28/2022    Derangement, knee internal, right 04/05/2022        Objective:   Ht 1.829 m (6')   Wt 83.9 kg (185 lb)   BMI 25.09 kg/m²     Physical Exam:  Constitutional: Alert, no distress, well-groomed.  Skin: No rashes in visible areas.  Eye: Round. Conjunctiva clear, lids normal. No icterus.   ENMT: Lips pink without lesions, good dentition, moist mucous membranes. Phonation normal.  Neck: No masses, no thyromegaly. Moves freely without pain.  Respiratory: Unlabored respiratory effort, no cough or audible wheeze  Psych: Alert and oriented x3, normal affect and mood.     Assessment and Plan:   The following  treatment plan was discussed:     1. Chronic marginal ulcer with perforation (HCC)    Other orders  - celecoxib (CELEBREX) 200 MG Cap  - oxyCODONE immediate-release (ROXICODONE) 5 MG Tab      Follow-up: No follow-ups on file.

## 2023-08-08 ENCOUNTER — APPOINTMENT (OUTPATIENT)
Dept: RADIOLOGY | Facility: MEDICAL CENTER | Age: 63
End: 2023-08-08
Attending: EMERGENCY MEDICINE
Payer: OTHER MISCELLANEOUS

## 2023-08-08 ENCOUNTER — HOSPITAL ENCOUNTER (EMERGENCY)
Facility: MEDICAL CENTER | Age: 63
End: 2023-08-08
Attending: EMERGENCY MEDICINE
Payer: OTHER MISCELLANEOUS

## 2023-08-08 VITALS
WEIGHT: 186.29 LBS | TEMPERATURE: 97.8 F | HEART RATE: 66 BPM | BODY MASS INDEX: 25.23 KG/M2 | SYSTOLIC BLOOD PRESSURE: 140 MMHG | DIASTOLIC BLOOD PRESSURE: 74 MMHG | RESPIRATION RATE: 16 BRPM | HEIGHT: 72 IN | OXYGEN SATURATION: 99 %

## 2023-08-08 DIAGNOSIS — S16.1XXA STRAIN OF NECK MUSCLE, INITIAL ENCOUNTER: ICD-10-CM

## 2023-08-08 PROCEDURE — 72070 X-RAY EXAM THORAC SPINE 2VWS: CPT

## 2023-08-08 PROCEDURE — 99284 EMERGENCY DEPT VISIT MOD MDM: CPT

## 2023-08-08 PROCEDURE — 72040 X-RAY EXAM NECK SPINE 2-3 VW: CPT

## 2023-08-08 RX ORDER — CYCLOBENZAPRINE HCL 5 MG
5-10 TABLET ORAL 3 TIMES DAILY PRN
Qty: 30 TABLET | Refills: 0 | Status: SHIPPED | OUTPATIENT
Start: 2023-08-08 | End: 2023-08-08 | Stop reason: SDUPTHER

## 2023-08-08 RX ORDER — CYCLOBENZAPRINE HCL 5 MG
5-10 TABLET ORAL 3 TIMES DAILY PRN
Qty: 20 TABLET | Refills: 0 | Status: SHIPPED | OUTPATIENT
Start: 2023-08-08

## 2023-08-08 ASSESSMENT — FIBROSIS 4 INDEX: FIB4 SCORE: 0.98
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
## 2023-08-14 NOTE — OR NURSING
Recovery complete and report called. Family updated again and patient will be transferred to his room with belongings, O2 tank is full and he is on 5L NC.   Total Volume Injected (Ccs- Only Use Numbers And Decimals): 0.1

## 2023-08-22 ENCOUNTER — APPOINTMENT (OUTPATIENT)
Dept: MEDICAL GROUP | Facility: PHYSICIAN GROUP | Age: 63
End: 2023-08-22

## 2024-06-29 ENCOUNTER — HOSPITAL ENCOUNTER (EMERGENCY)
Facility: MEDICAL CENTER | Age: 64
End: 2024-06-29
Attending: EMERGENCY MEDICINE
Payer: OTHER MISCELLANEOUS

## 2024-06-29 ENCOUNTER — APPOINTMENT (OUTPATIENT)
Dept: RADIOLOGY | Facility: MEDICAL CENTER | Age: 64
End: 2024-06-29
Attending: EMERGENCY MEDICINE
Payer: OTHER MISCELLANEOUS

## 2024-06-29 VITALS
BODY MASS INDEX: 27.09 KG/M2 | OXYGEN SATURATION: 98 % | TEMPERATURE: 97.4 F | SYSTOLIC BLOOD PRESSURE: 135 MMHG | HEIGHT: 72 IN | DIASTOLIC BLOOD PRESSURE: 78 MMHG | HEART RATE: 78 BPM | WEIGHT: 200 LBS | RESPIRATION RATE: 18 BRPM

## 2024-06-29 DIAGNOSIS — S91.311A LACERATION OF RIGHT FOOT, INITIAL ENCOUNTER: ICD-10-CM

## 2024-06-29 PROCEDURE — 73590 X-RAY EXAM OF LOWER LEG: CPT | Mod: LT

## 2024-06-29 PROCEDURE — 304217 HCHG IRRIGATION SYSTEM

## 2024-06-29 PROCEDURE — 99284 EMERGENCY DEPT VISIT MOD MDM: CPT

## 2024-06-29 PROCEDURE — A9270 NON-COVERED ITEM OR SERVICE: HCPCS | Performed by: EMERGENCY MEDICINE

## 2024-06-29 PROCEDURE — 303747 HCHG EXTRA SUTURE

## 2024-06-29 PROCEDURE — 700102 HCHG RX REV CODE 250 W/ 637 OVERRIDE(OP): Performed by: EMERGENCY MEDICINE

## 2024-06-29 PROCEDURE — 73562 X-RAY EXAM OF KNEE 3: CPT | Mod: RT

## 2024-06-29 PROCEDURE — 700111 HCHG RX REV CODE 636 W/ 250 OVERRIDE (IP): Mod: JZ | Performed by: EMERGENCY MEDICINE

## 2024-06-29 PROCEDURE — 304999 HCHG REPAIR-SIMPLE/INTERMED LEVEL 1

## 2024-06-29 PROCEDURE — 73630 X-RAY EXAM OF FOOT: CPT | Mod: LT

## 2024-06-29 RX ORDER — HYDROCODONE BITARTRATE AND ACETAMINOPHEN 5; 325 MG/1; MG/1
1 TABLET ORAL ONCE
Status: COMPLETED | OUTPATIENT
Start: 2024-06-29 | End: 2024-06-29

## 2024-06-29 RX ADMIN — LIDOCAINE HYDROCHLORIDE 10 ML: 10 INJECTION, SOLUTION EPIDURAL; INFILTRATION; INTRACAUDAL; PERINEURAL at 19:30

## 2024-06-29 RX ADMIN — HYDROCODONE BITARTRATE AND ACETAMINOPHEN 1 TABLET: 5; 325 TABLET ORAL at 19:41

## 2024-06-29 ASSESSMENT — FIBROSIS 4 INDEX: FIB4 SCORE: 1

## 2024-06-29 ASSESSMENT — PAIN DESCRIPTION - PAIN TYPE: TYPE: ACUTE PAIN

## 2024-06-30 NOTE — ED PROVIDER NOTES
ED Provider Note    CHIEF COMPLAINT  Chief Complaint   Patient presents with    Laceration     Patient slid down about 14 stairs and hit his left foot on the cement. Laceration to the top of his left foot and abrasion to left shin. Denies head strike.        EXTERNAL RECORDS REVIEWED      HPI/ROS  LIMITATION TO HISTORY     OUTSIDE HISTORIAN(S):      Kris Lewis is a 63 y.o. male who presents to the emergency department with laceration to left foot left foot and right knee pain.  Patient reportedly slid down some stairs today.  He denies any head injury loss consciousness denies any pain in his chest abdomen or pelvis no upper extremity pain states his last tetanus shot was 3 years prior.    PAST MEDICAL HISTORY   has a past medical history of Arthritis, Dental disorder, and Right knee pain (2022).    SURGICAL HISTORY   has a past surgical history that includes gastric bypass laparoscopic; knee arthroscopy (Left); knee scope,single menisectomy (Right, 2022); loose body removal (Right, 2022); and lap,diagnostic abdomen (N/A, 2022).    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 (Registered Nurse) on 24 at 1820  Med List Status: Partial     Medication Last Dose Status   acetaminophen (TYLENOL) 325 MG Tab  Active   celecoxib (CELEBREX) 200 MG Cap   Active   cyclobenzaprine (FLEXERIL) 5 mg tablet  Active   oxyCODONE immediate-release (ROXICODONE) 5 MG Tab  Active                    ALLERGIES  No Known Allergies    PHYSICAL EXAM  VITAL SIGNS: BP (!) 150/84   Pulse 69   Temp 37 °C (98.6 °F) (Temporal)   Resp 16   Ht 1.829 m (6')   Wt 90.7 kg (200 lb)   SpO2 92%   BMI 27.12 kg/m²    Pulse ox interpretation: I interpret this pulse ox as normal.  Constitutional: Alert and oriented x 3, minimal distress  HEENT: Atraumatic normocephalic, pupils are equal round reactive to light extraocular movements are intact. The nares is clear, external ears are normal, mouth shows moist mucous membranes normal dentition for age  Neck: Supple, no JVD no tracheal deviation  Cardiovascular: Regular rate and rhythm no murmur rub or gallop 2+ pulses peripherally x4  Thorax & Lungs: No respiratory distress, no wheezes rales or rhonchi, No chest tenderness.   GI: Soft nontender nondistended positive bowel sounds, no peritoneal signs  Skin: Warm dry no acute rash or lesion 3 cm avulsion laceration of the dorsum of the left foot minimal abrasion left anterior shin  Musculoskeletal:  skin changes as above, minimal tenderness throughout the dorsum of the left foot and throughout the right knee able to ambulate with difficulty  Neurologic: Cranial nerves III through XII are grossly intact no sensory deficit no cerebellar dysfunction   Psychiatric: Appropriate affect for situation at this time        RADIOLOGY/PROCEDURES   I have independently interpreted the diagnostic imaging associated with this visit and am waiting the final reading from the radiologist.   My preliminary interpretation is as follows:     Radiologist interpretation:  DX-KNEE 3 VIEWS RIGHT   Final Result      1.  There is moderate tricompartmental osteoarthritis of the left knee.      DX-TIBIA AND FIBULA LEFT   Final Result      No fracture detected.      DX-FOOT-COMPLETE 3+ LEFT   Final Result      1.  There is  multifocal degenerative change throughout the left foot.          COURSE & MEDICAL DECISION MAKING  Laceration Repair Procedure Note    Indication: Laceration    Procedure: The patient was placed in the appropriate position and anesthesia around the laceration was obtained by infiltration using 1% Lidocaine without epinephrine. The area was then irrigated with high pressure normal saline. The laceration was closed with 4-0 Ethilon using interrupted sutures. There were no additional lacerations requiring repair. The wound area was then dressed with a sterile dressing.      Total repaired wound length: 3 cm.     Other Items: Suture count: 6    The patient tolerated the procedure well.    Complications: None      ASSESSMENT, COURSE AND PLAN  Care Narrative: Pleasant 63-year-old male slid down some stairs this evening and laceration left foot.  Laceration repaired as above.  X-rays are all negative for acute fracture however does show degenerative disease in all joints.  Patient was given instructions return in 10 days for suture removal and wound check sooner for worsening pain redness swelling drainage any other acute symptom change or concern otherwise discharged in stable and improved condition.    /78   Pulse 78   Temp 36.3 °C (97.4 °F) (Temporal)   Resp 18   Ht 1.829 m (6')   Wt 90.7 kg (200 lb)   SpO2 98% Comment: RA  BMI 27.12 kg/m²           ADDITIONAL PROBLEMS MANAGED      DISPOSITION AND DISCUSSIONS  I have discussed management of the patient with the following physicians and MIKEY's:      Discussion of management with other QHP or appropriate source(s): None     Escalation of care considered, and ultimately not performed:    Barriers to care at this time, including but not limited to: .     Decision tools and prescription drugs considered including, but not limited to: .  /78   Pulse 78   Temp 36.3 °C (97.4 °F) (Temporal)   Resp 18   Ht 1.829 m (6')   Wt 90.7 kg (200 lb)   SpO2 98%  Comment: RA  BMI 27.12 kg/m²     [facility], Emergency Dept  [address] [address] [phone]  In 10 days  For wound re-check, For suture removal    [facility], Emergency Dept  [address] [address] [phone]    in 12-24 hours if symptoms persist, immediately If symptoms worsen, or if you develop any other symptoms or concerns      FINAL DIAGNOSIS  1. Laceration of right foot, initial encounter    2.  Right knee contusion-abrasion       Electronically signed by: Munir Hale M.D.,

## 2024-06-30 NOTE — ED NOTES
Patient ambulated to T-1 without incident. Primary assessment complete. Patient oriented to care area. Chart up for ERP.

## 2024-06-30 NOTE — ED NOTES
Bedside report received from off going RN/tech: Max[facility], assumed care of patient.  POC discussed with patient. Call light within reach, all needs addressed at this time.       Fall risk interventions in place: Patient's personal possessions are with in their safe reach, Give patient urinal if applicable, and Keep floor surfaces clean and dry (all applicable per [place] Fall risk assessment)   Continuous monitoring: Pulse Ox or Blood Pressure  IVF/IV medications: Not Applicable   Oxygen: Room Air  Bedside sitter: Not Applicable   Isolation: Not Applicable

## 2024-06-30 NOTE — ED TRIAGE NOTES
Chief Complaint   Patient presents with    Laceration     Patient slid down about 14 stairs and hit his left foot on the cement. Laceration to the top of his left foot and abrasion to left shin. Denies head strike.        Patient to triage via wheelchair, AAOx4, Appropriate precautions in place.     Explained wait time and triage process. Placed back in lobby. Told to notify ED tech or RN of any changes, verbalized understanding.    BP (!) 150/84   Pulse 69   Temp 37 °C (98.6 °F) (Temporal)   Resp 16   Ht 1.829 m (6')   Wt 90.7 kg (200 lb)   SpO2 92%   BMI 27.12 kg/m²

## 2025-03-20 ENCOUNTER — APPOINTMENT (OUTPATIENT)
Dept: RADIOLOGY | Facility: MEDICAL CENTER | Age: 65
End: 2025-03-20
Attending: EMERGENCY MEDICINE
Payer: OTHER MISCELLANEOUS

## 2025-03-20 ENCOUNTER — OFFICE VISIT (OUTPATIENT)
Dept: URGENT CARE | Facility: CLINIC | Age: 65
End: 2025-03-20
Payer: OTHER MISCELLANEOUS

## 2025-03-20 ENCOUNTER — HOSPITAL ENCOUNTER (EMERGENCY)
Facility: MEDICAL CENTER | Age: 65
End: 2025-03-20
Attending: EMERGENCY MEDICINE
Payer: OTHER MISCELLANEOUS

## 2025-03-20 VITALS
TEMPERATURE: 97.4 F | HEART RATE: 100 BPM | BODY MASS INDEX: 29.62 KG/M2 | WEIGHT: 200 LBS | HEIGHT: 69 IN | DIASTOLIC BLOOD PRESSURE: 88 MMHG | OXYGEN SATURATION: 97 % | SYSTOLIC BLOOD PRESSURE: 144 MMHG | RESPIRATION RATE: 18 BRPM

## 2025-03-20 VITALS
OXYGEN SATURATION: 98 % | DIASTOLIC BLOOD PRESSURE: 70 MMHG | HEART RATE: 97 BPM | TEMPERATURE: 98.2 F | RESPIRATION RATE: 18 BRPM | HEIGHT: 72 IN | BODY MASS INDEX: 27.44 KG/M2 | WEIGHT: 202.6 LBS | SYSTOLIC BLOOD PRESSURE: 148 MMHG

## 2025-03-20 DIAGNOSIS — S09.90XA INJURY OF HEAD, INITIAL ENCOUNTER: ICD-10-CM

## 2025-03-20 DIAGNOSIS — S09.90XA CLOSED HEAD INJURY, INITIAL ENCOUNTER: ICD-10-CM

## 2025-03-20 DIAGNOSIS — S00.81XA ABRASION OF FACE, INITIAL ENCOUNTER: ICD-10-CM

## 2025-03-20 DIAGNOSIS — S61.215A LACERATION OF LEFT RING FINGER WITHOUT FOREIGN BODY WITHOUT DAMAGE TO NAIL, INITIAL ENCOUNTER: ICD-10-CM

## 2025-03-20 DIAGNOSIS — S00.83XA CONTUSION OF FACE, INITIAL ENCOUNTER: ICD-10-CM

## 2025-03-20 DIAGNOSIS — S61.215A LACERATION OF LEFT RING FINGER, FOREIGN BODY PRESENCE UNSPECIFIED, NAIL DAMAGE STATUS UNSPECIFIED, INITIAL ENCOUNTER: ICD-10-CM
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
## 2025-03-20 PROCEDURE — 73130 X-RAY EXAM OF HAND: CPT | Mod: LT

## 2025-03-20 PROCEDURE — 304217 HCHG IRRIGATION SYSTEM

## 2025-03-20 PROCEDURE — 700102 HCHG RX REV CODE 250 W/ 637 OVERRIDE(OP): Performed by: EMERGENCY MEDICINE

## 2025-03-20 PROCEDURE — 303747 HCHG EXTRA SUTURE

## 2025-03-20 PROCEDURE — 99999 PR NO CHARGE: CPT | Performed by: PHYSICIAN ASSISTANT

## 2025-03-20 PROCEDURE — 304999 HCHG REPAIR-SIMPLE/INTERMED LEVEL 1

## 2025-03-20 PROCEDURE — 97597 DBRDMT OPN WND 1ST 20 CM/<: CPT

## 2025-03-20 PROCEDURE — 700101 HCHG RX REV CODE 250: Performed by: EMERGENCY MEDICINE

## 2025-03-20 PROCEDURE — 99283 EMERGENCY DEPT VISIT LOW MDM: CPT

## 2025-03-20 PROCEDURE — A9270 NON-COVERED ITEM OR SERVICE: HCPCS | Performed by: EMERGENCY MEDICINE

## 2025-03-20 RX ORDER — CEPHALEXIN 500 MG/1
500 CAPSULE ORAL ONCE
Status: COMPLETED | OUTPATIENT
Start: 2025-03-20 | End: 2025-03-20

## 2025-03-20 RX ORDER — CEPHALEXIN 500 MG/1
500 CAPSULE ORAL 4 TIMES DAILY
Qty: 28 CAPSULE | Refills: 0 | Status: ACTIVE | OUTPATIENT
Start: 2025-03-20 | End: 2025-03-27

## 2025-03-20 RX ORDER — LIDOCAINE HYDROCHLORIDE 20 MG/ML
20 INJECTION, SOLUTION INFILTRATION; PERINEURAL ONCE
Status: COMPLETED | OUTPATIENT
Start: 2025-03-20 | End: 2025-03-20

## 2025-03-20 RX ADMIN — LIDOCAINE HYDROCHLORIDE 20 ML: 20 INJECTION, SOLUTION INFILTRATION; PERINEURAL at 13:15

## 2025-03-20 RX ADMIN — CEPHALEXIN 500 MG: 500 CAPSULE ORAL at 15:16

## 2025-03-20 ASSESSMENT — PAIN DESCRIPTION - PAIN TYPE: TYPE: ACUTE PAIN

## 2025-03-20 NOTE — DISCHARGE INSTRUCTIONS
You are leaving AGAINST MEDICAL ADVICE.  I recommend a head CT because of your head injury.  I also recommend a facial CT and a cervical spine CT.  You could have an injury that could result in permanent disability or death.  Please return to the ED if you would like to complete this evaluation.    Keep finger laceration clean and dry.  Watch for signs of infection including increasing pain, swelling, numbness, ting, weakness or other concerns.  Sutures out in 10 days here or your doctor.
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
## (undated) DEVICE — CHLORAPREP 26 ML APPLICATOR - ORANGE TINT(25/CA)

## (undated) DEVICE — TRAY CATHETER FOLEY URINE METER W/STATLOCK 350ML (10EA/CA)

## (undated) DEVICE — SUTURE 1 VICRYL PLUS CTX - 36 INCH (36/BX)

## (undated) DEVICE — LACTATED RINGERS INJ 1000 ML - (14EA/CA 60CA/PF)

## (undated) DEVICE — PAD PREP 24 X 48 CUFFED - (100/CA)

## (undated) DEVICE — SUTURE 2-0 VICRYL PLUS TP-1 - (24/BX)

## (undated) DEVICE — PACK ARTHROSCOPY - (2EA//CA)

## (undated) DEVICE — SUTURE 4-0 VICRYL PLUS FS-2 - 27 INCH (36/BX)

## (undated) DEVICE — SUTURE 3-0 MONOCRYL PLUS PS-2 - (12/BX)

## (undated) DEVICE — ELECTRODE DUAL RETURN W/ CORD - (50/PK)

## (undated) DEVICE — GOWN SURGICAL X-LARGE ULTRA - FILM-REINFORCED (20/CA)

## (undated) DEVICE — NEEDLE INSFL 120MM 14GA VRRS - (20/BX)

## (undated) DEVICE — SUTURE GENERAL

## (undated) DEVICE — SUTURE 2-0 20CM STRATAFIX SPIRAL SH NEEDLE (12/BX)

## (undated) DEVICE — SUCTION INSTRUMENT YANKAUER BULBOUS TIP W/O VENT (50EA/CA)

## (undated) DEVICE — SODIUM CHL. IRRIGATION 0.9% 3000ML (4EA/CA 65CA/PF)

## (undated) DEVICE — CLOSURE SKIN STRIP 1/2 X 4 IN - (STERI STRIP) (50/BX 4BX/CA)

## (undated) DEVICE — GLOVE BIOGEL INDICATOR SZ 8.5 SURGICAL PF LTX - (50/BX 4BX/CA)

## (undated) DEVICE — SPONGE GAUZESTER 4 X 4 4PLY - (128PK/CA)

## (undated) DEVICE — GOWN WARMING STANDARD FLEX - (30/CA)

## (undated) DEVICE — TUBING CASSETTE CROSSFLOW INTEGRATED (10EA/CA)

## (undated) DEVICE — TROCAR Z THREAD 11 X 100 - BLADED (6/BX)

## (undated) DEVICE — SET TUBING PNEUMOCLEAR HIGH FLOW SMOKE EVACUATION (10EA/BX)

## (undated) DEVICE — SLEEVE, VASO, THIGH, MED

## (undated) DEVICE — DERMABOND ADVANCED - (12EA/BX)

## (undated) DEVICE — TUBING DAY USE W/CARTRIDGE (10EA/BX)

## (undated) DEVICE — DRAPE U SPLIT IMP 54 X 76 - (24/CA)

## (undated) DEVICE — CANNULA W/SEAL 5X100 Z-THRE - ADED KII (12/BX)

## (undated) DEVICE — SENSOR OXIMETER ADULT SPO2 RD SET (20EA/BX)

## (undated) DEVICE — DRAPE LARGE 3 QUARTER - (20/CA)

## (undated) DEVICE — GLOVE, LITE (PAIR)

## (undated) DEVICE — CANISTER SUCTION 3000ML MECHANICAL FILTER AUTO SHUTOFF MEDI-VAC NONSTERILE LF DISP  (40EA/CA)

## (undated) DEVICE — COVER LIGHT HANDLE ALC PLUS DISP (18EA/BX)

## (undated) DEVICE — GLOVE BIOGEL SZ 8 SURGICAL PF LTX - (50PR/BX 4BX/CA)

## (undated) DEVICE — BANDAID SHEER STRIP 3/4 IN (100EA/BX 12BX/CA)

## (undated) DEVICE — TROCAR 5X100 BLADED Z-THREAD - KII (6/BX)

## (undated) DEVICE — DRAPE MAYO STAND - (30/CA)

## (undated) DEVICE — TUBING CLEARLINK DUO-VENT - C-FLO (48EA/CA)

## (undated) DEVICE — PACK MAJOR BASIN - (2EA/CA)

## (undated) DEVICE — GLOVE BIOGEL INDICATOR SZ 7.5 SURGICAL PF LTX - (50PR/BX 4BX/CA)

## (undated) DEVICE — Device

## (undated) DEVICE — SHAVER4.0 AGGRESSIVE + FORMLA (5EA/BX)

## (undated) DEVICE — SUTURE 0 VICRYL PLUS UR-6 - 27 INCH (36/BX)

## (undated) DEVICE — STAPLER SKIN DISP - (6/BX 10BX/CA) VISISTAT

## (undated) DEVICE — SUTURE 2-0 COATED VICRYL PLUS - 12 X 18 INCH (12/BX)

## (undated) DEVICE — SODIUM CHL IRRIGATION 0.9% 1000ML (12EA/CA)

## (undated) DEVICE — SET LEADWIRE 5 LEAD BEDSIDE DISPOSABLE ECG (1SET OF 5/EA)

## (undated) DEVICE — TOWEL STOP TIMEOUT SAFETY FLAG (40EA/CA)

## (undated) DEVICE — STERI STRIP COMPOUND BENZOIN - TINCTURE 0.6ML WITH APPLICATOR (40EA/BX)

## (undated) DEVICE — SUTURE 2-0 SILK SH (36PK/BX)

## (undated) DEVICE — PADDING CAST 6 IN STERILE - 6 X 4 YDS (24/CA)

## (undated) DEVICE — SUTURE 3-0 VICRYL PLUS SH - 8X 18 INCH (12/BX)

## (undated) DEVICE — RESERVOIR SUCTION 100 CC - SILICONE (20EA/CA)

## (undated) DEVICE — SET EXTENSION WITH 2 PORTS (48EA/CA) ***PART #2C8610 IS A SUBSTITUTE*****

## (undated) DEVICE — BAG SPONGE COUNT 10.25 X 32 - BLUE (250/CA)

## (undated) DEVICE — GLOVE BIOGEL SZ 7.5 SURGICAL PF LTX - (50PR/BX 4BX/CA)

## (undated) DEVICE — BOVIE  BLADE 6 EXTENDED - (50/PK)

## (undated) DEVICE — BANDAID X-LARGE 2 X 4 IN LF (50EA/BX)